# Patient Record
Sex: FEMALE | HISPANIC OR LATINO | Employment: UNEMPLOYED | URBAN - METROPOLITAN AREA
[De-identification: names, ages, dates, MRNs, and addresses within clinical notes are randomized per-mention and may not be internally consistent; named-entity substitution may affect disease eponyms.]

---

## 2017-11-17 ENCOUNTER — HOSPITAL ENCOUNTER (EMERGENCY)
Facility: MEDICAL CENTER | Age: 42
End: 2017-11-18
Attending: EMERGENCY MEDICINE
Payer: COMMERCIAL

## 2017-11-17 ENCOUNTER — APPOINTMENT (OUTPATIENT)
Dept: RADIOLOGY | Facility: MEDICAL CENTER | Age: 42
End: 2017-11-17
Attending: EMERGENCY MEDICINE
Payer: COMMERCIAL

## 2017-11-17 DIAGNOSIS — N39.0 URINARY TRACT INFECTION WITHOUT HEMATURIA, SITE UNSPECIFIED: ICD-10-CM

## 2017-11-17 DIAGNOSIS — R50.9 FEVER, UNSPECIFIED FEVER CAUSE: ICD-10-CM

## 2017-11-17 DIAGNOSIS — R51.9 NONINTRACTABLE HEADACHE, UNSPECIFIED CHRONICITY PATTERN, UNSPECIFIED HEADACHE TYPE: ICD-10-CM

## 2017-11-17 LAB
ALBUMIN SERPL BCP-MCNC: 3.9 G/DL (ref 3.2–4.9)
ALBUMIN/GLOB SERPL: 1.1 G/DL
ALP SERPL-CCNC: 211 U/L (ref 30–99)
ALT SERPL-CCNC: 59 U/L (ref 2–50)
ANION GAP SERPL CALC-SCNC: 13 MMOL/L (ref 0–11.9)
ANISOCYTOSIS BLD QL SMEAR: ABNORMAL
AST SERPL-CCNC: 100 U/L (ref 12–45)
BASOPHILS # BLD AUTO: 0 % (ref 0–1.8)
BASOPHILS # BLD: 0 K/UL (ref 0–0.12)
BILIRUB SERPL-MCNC: 1.5 MG/DL (ref 0.1–1.5)
BUN SERPL-MCNC: 8 MG/DL (ref 8–22)
CALCIUM SERPL-MCNC: 8.8 MG/DL (ref 8.5–10.5)
CHLORIDE SERPL-SCNC: 100 MMOL/L (ref 96–112)
CO2 SERPL-SCNC: 20 MMOL/L (ref 20–33)
CREAT SERPL-MCNC: 0.81 MG/DL (ref 0.5–1.4)
EOSINOPHIL # BLD AUTO: 0 K/UL (ref 0–0.51)
EOSINOPHIL NFR BLD: 0 % (ref 0–6.9)
ERYTHROCYTE [DISTWIDTH] IN BLOOD BY AUTOMATED COUNT: 38.9 FL (ref 35.9–50)
GFR SERPL CREATININE-BSD FRML MDRD: >60 ML/MIN/1.73 M 2
GLOBULIN SER CALC-MCNC: 3.7 G/DL (ref 1.9–3.5)
GLUCOSE SERPL-MCNC: 154 MG/DL (ref 65–99)
HCT VFR BLD AUTO: 34.9 % (ref 37–47)
HGB BLD-MCNC: 12 G/DL (ref 12–16)
LACTATE BLD-SCNC: 2.9 MMOL/L (ref 0.5–2)
LYMPHOCYTES # BLD AUTO: 0.99 K/UL (ref 1–4.8)
LYMPHOCYTES NFR BLD: 22.6 % (ref 22–41)
MANUAL DIFF BLD: NORMAL
MCH RBC QN AUTO: 25.5 PG (ref 27–33)
MCHC RBC AUTO-ENTMCNC: 34.4 G/DL (ref 33.6–35)
MCV RBC AUTO: 74.3 FL (ref 81.4–97.8)
MICROCYTES BLD QL SMEAR: ABNORMAL
MONOCYTES # BLD AUTO: 0.3 K/UL (ref 0–0.85)
MONOCYTES NFR BLD AUTO: 6.9 % (ref 0–13.4)
MORPHOLOGY BLD-IMP: NORMAL
NEUTROPHILS # BLD AUTO: 3.1 K/UL (ref 2–7.15)
NEUTROPHILS NFR BLD: 67 % (ref 44–72)
NEUTS BAND NFR BLD MANUAL: 3.5 % (ref 0–10)
NRBC # BLD AUTO: 0 K/UL
NRBC BLD AUTO-RTO: 0 /100 WBC
PLATELET # BLD AUTO: 147 K/UL (ref 164–446)
PLATELET BLD QL SMEAR: NORMAL
PMV BLD AUTO: 10.3 FL (ref 9–12.9)
POTASSIUM SERPL-SCNC: 3.1 MMOL/L (ref 3.6–5.5)
PROT SERPL-MCNC: 7.6 G/DL (ref 6–8.2)
RBC # BLD AUTO: 4.7 M/UL (ref 4.2–5.4)
RBC BLD AUTO: PRESENT
SODIUM SERPL-SCNC: 133 MMOL/L (ref 135–145)
WBC # BLD AUTO: 4.4 K/UL (ref 4.8–10.8)

## 2017-11-17 PROCEDURE — 87186 SC STD MICRODIL/AGAR DIL: CPT

## 2017-11-17 PROCEDURE — 80053 COMPREHEN METABOLIC PANEL: CPT

## 2017-11-17 PROCEDURE — 71010 DX-CHEST-PORTABLE (1 VIEW): CPT

## 2017-11-17 PROCEDURE — 700102 HCHG RX REV CODE 250 W/ 637 OVERRIDE(OP): Performed by: EMERGENCY MEDICINE

## 2017-11-17 PROCEDURE — 700105 HCHG RX REV CODE 258: Performed by: EMERGENCY MEDICINE

## 2017-11-17 PROCEDURE — 70480 CT ORBIT/EAR/FOSSA W/O DYE: CPT

## 2017-11-17 PROCEDURE — A9270 NON-COVERED ITEM OR SERVICE: HCPCS | Performed by: EMERGENCY MEDICINE

## 2017-11-17 PROCEDURE — 93005 ELECTROCARDIOGRAM TRACING: CPT | Performed by: EMERGENCY MEDICINE

## 2017-11-17 PROCEDURE — 87086 URINE CULTURE/COLONY COUNT: CPT

## 2017-11-17 PROCEDURE — 87040 BLOOD CULTURE FOR BACTERIA: CPT | Mod: 91

## 2017-11-17 PROCEDURE — 85027 COMPLETE CBC AUTOMATED: CPT

## 2017-11-17 PROCEDURE — 99285 EMERGENCY DEPT VISIT HI MDM: CPT

## 2017-11-17 PROCEDURE — 81001 URINALYSIS AUTO W/SCOPE: CPT

## 2017-11-17 PROCEDURE — 85007 BL SMEAR W/DIFF WBC COUNT: CPT

## 2017-11-17 PROCEDURE — 83605 ASSAY OF LACTIC ACID: CPT

## 2017-11-17 PROCEDURE — 96361 HYDRATE IV INFUSION ADD-ON: CPT

## 2017-11-17 PROCEDURE — 87077 CULTURE AEROBIC IDENTIFY: CPT

## 2017-11-17 PROCEDURE — 36415 COLL VENOUS BLD VENIPUNCTURE: CPT

## 2017-11-17 RX ORDER — AMPICILLIN AND SULBACTAM 2; 1 G/1; G/1
3 INJECTION, POWDER, FOR SOLUTION INTRAMUSCULAR; INTRAVENOUS ONCE
Status: DISCONTINUED | OUTPATIENT
Start: 2017-11-18 | End: 2017-11-17

## 2017-11-17 RX ORDER — SODIUM CHLORIDE 9 MG/ML
1000 INJECTION, SOLUTION INTRAVENOUS
Status: DISCONTINUED | OUTPATIENT
Start: 2017-11-17 | End: 2017-11-18 | Stop reason: HOSPADM

## 2017-11-17 RX ORDER — ACETAMINOPHEN 325 MG/1
1000 TABLET ORAL ONCE
Status: COMPLETED | OUTPATIENT
Start: 2017-11-17 | End: 2017-11-17

## 2017-11-17 RX ORDER — SODIUM CHLORIDE 9 MG/ML
30 INJECTION, SOLUTION INTRAVENOUS ONCE
Status: COMPLETED | OUTPATIENT
Start: 2017-11-17 | End: 2017-11-17

## 2017-11-17 RX ADMIN — SODIUM CHLORIDE 2232 ML: 9 INJECTION, SOLUTION INTRAVENOUS at 23:04

## 2017-11-17 RX ADMIN — ACETAMINOPHEN 975 MG: 325 TABLET, FILM COATED ORAL at 23:15

## 2017-11-17 ASSESSMENT — LIFESTYLE VARIABLES: DO YOU DRINK ALCOHOL: NO

## 2017-11-18 VITALS
HEIGHT: 64 IN | TEMPERATURE: 100.6 F | WEIGHT: 164 LBS | BODY MASS INDEX: 28 KG/M2 | OXYGEN SATURATION: 98 % | RESPIRATION RATE: 18 BRPM | DIASTOLIC BLOOD PRESSURE: 87 MMHG | SYSTOLIC BLOOD PRESSURE: 117 MMHG | HEART RATE: 90 BPM

## 2017-11-18 LAB
APPEARANCE UR: ABNORMAL
BACTERIA #/AREA URNS HPF: ABNORMAL /HPF
BILIRUB UR QL STRIP.AUTO: NEGATIVE
COLOR UR: YELLOW
EKG IMPRESSION: NORMAL
EPI CELLS #/AREA URNS HPF: ABNORMAL /HPF
GLUCOSE UR STRIP.AUTO-MCNC: NEGATIVE MG/DL
HYALINE CASTS #/AREA URNS LPF: ABNORMAL /LPF
KETONES UR STRIP.AUTO-MCNC: NEGATIVE MG/DL
LACTATE BLD-SCNC: 1.2 MMOL/L (ref 0.5–2)
LEUKOCYTE ESTERASE UR QL STRIP.AUTO: ABNORMAL
MICRO URNS: ABNORMAL
NITRITE UR QL STRIP.AUTO: POSITIVE
PH UR STRIP.AUTO: 6 [PH]
PROT UR QL STRIP: NEGATIVE MG/DL
RBC # URNS HPF: ABNORMAL /HPF
RBC UR QL AUTO: ABNORMAL
SP GR UR STRIP.AUTO: 1.01
TRANS CELLS #/AREA URNS HPF: ABNORMAL /HPF
UROBILINOGEN UR STRIP.AUTO-MCNC: 1 MG/DL
WBC #/AREA URNS HPF: ABNORMAL /HPF

## 2017-11-18 PROCEDURE — 96361 HYDRATE IV INFUSION ADD-ON: CPT

## 2017-11-18 PROCEDURE — 96372 THER/PROPH/DIAG INJ SC/IM: CPT

## 2017-11-18 PROCEDURE — 700101 HCHG RX REV CODE 250: Performed by: EMERGENCY MEDICINE

## 2017-11-18 PROCEDURE — 96365 THER/PROPH/DIAG IV INF INIT: CPT

## 2017-11-18 PROCEDURE — 700111 HCHG RX REV CODE 636 W/ 250 OVERRIDE (IP): Performed by: EMERGENCY MEDICINE

## 2017-11-18 PROCEDURE — 83605 ASSAY OF LACTIC ACID: CPT

## 2017-11-18 RX ORDER — CLINDAMYCIN HYDROCHLORIDE 150 MG/1
300 CAPSULE ORAL 3 TIMES DAILY
Qty: 60 CAP | Refills: 0 | Status: SHIPPED | OUTPATIENT
Start: 2017-11-18 | End: 2017-11-18

## 2017-11-18 RX ORDER — CEFDINIR 300 MG/1
300 CAPSULE ORAL 2 TIMES DAILY
Qty: 20 CAP | Refills: 0 | Status: SHIPPED | OUTPATIENT
Start: 2017-11-18 | End: 2020-04-18

## 2017-11-18 RX ORDER — NAPROXEN 500 MG/1
500 TABLET ORAL 2 TIMES DAILY WITH MEALS
Qty: 20 TAB | Refills: 0 | Status: SHIPPED | OUTPATIENT
Start: 2017-11-18 | End: 2017-11-28

## 2017-11-18 RX ORDER — KETOROLAC TROMETHAMINE 30 MG/ML
30 INJECTION, SOLUTION INTRAMUSCULAR; INTRAVENOUS ONCE
Status: COMPLETED | OUTPATIENT
Start: 2017-11-18 | End: 2017-11-18

## 2017-11-18 RX ORDER — CLINDAMYCIN PHOSPHATE 900 MG/50ML
900 INJECTION, SOLUTION INTRAVENOUS ONCE
Status: COMPLETED | OUTPATIENT
Start: 2017-11-18 | End: 2017-11-18

## 2017-11-18 RX ADMIN — CLINDAMYCIN IN 5 PERCENT DEXTROSE 900 MG: 18 INJECTION, SOLUTION INTRAVENOUS at 00:58

## 2017-11-18 RX ADMIN — KETOROLAC TROMETHAMINE 30 MG: 30 INJECTION, SOLUTION INTRAMUSCULAR at 00:58

## 2017-11-18 NOTE — ED PROVIDER NOTES
"ED Provider Note    ED Provider Note      Primary care provider: Pcp Pt States None    CHIEF COMPLAINT  Chief Complaint   Patient presents with   • Headache   • Earache       HPI  Marce Johnson is a 42 y.o. female who presents to the Emergency DepartmentChief complaint of left-sided headache. Patient was previously diagnosed with otitis media took a couple doses of amoxicillin and begin have rash that that she discontinued. Over the last few days she's had progressive headache she's had severe chills and subjective fevers. Minor nausea no emesis. The headache is over theLeft side of her head the occipital area with some left-sided neck pain. No right-sided neck pain no midline pain. Patient also reports some diffuse myalgias. No sore throat no altered mental status she denies neck pain she's had no chest pain or shortness breath has had a slight cough no abdominal clinical . Dysuria hematuria melena hematochezia pains rated as moderate without alleviating or aggravating factors at this time.    REVIEW OF SYSTEMS  10 systems reviewed and otherwise negative, pertinent positives and negatives listed in the history of present illness.    PAST MEDICAL HISTORY       SURGICAL HISTORY   has a past surgical history that includes primary c section (x 3 ) and repeat c section w tubal ligation (2/6/2011).    SOCIAL HISTORY  Social History   Substance Use Topics   • Smoking status: Never Smoker   • Smokeless tobacco: Not on file   • Alcohol use No      History   Drug Use No       FAMILY HISTORY  Non-Contributory    CURRENT MEDICATIONS  Home Medications    **Home medications have not yet been reviewed for this encounter**         ALLERGIES  Allergies   Allergen Reactions   • Nkda [No Known Drug Allergy]        PHYSICAL EXAM  VITAL SIGNS: /93   Pulse (!) 115   Temp (!) 39.6 °C (103.2 °F)   Resp 16   Ht 1.626 m (5' 4\")   Wt 74.4 kg (164 lb)   LMP 05/17/2010 Comment: Sure first day LNMP  SpO2 98%   BMI 28.15 " kg/m²   Pulse ox interpretation: I interpret this pulse ox as normal.  Constitutional: Alert and oriented x 3,Moderate Distress  HEENT: Atraumatic normocephalic, pupils are equal round reactive to light extraocular movements are intact. The nares is clear, external ears are normal the right tympanic membrane is opacified slightly retracted external auditory canal is erythematous. The left TM and external auditory canal unremarkable her is minor tenderness to palpation over the mastoid on the right is not soft no overlying erythema or warmth., mouth shows moist mucous membranes  Neck: Supple, no JVD no tracheal deviation no painwith rapid axial rotation or flexion or extension  Cardiovascular: Tachycardic no murmur rub or gallop 2+ pulses peripherally x4  Thorax & Lungs: No respiratory distress, no wheezes rales or rhonchi, No chest tenderness.   GI: Soft nontender nondistended positive bowel sounds, no peritoneal signs  Skin: Warm dry no acute rash or lesion  Musculoskeletal: Moving all extremities with full range and 5 of 5 strength, no acute  deformity  Neurologic: Cranial nerves III through XII are grossly intact, no sensory deficit, no cerebellar dysfunction   Psychiatric: Appropriate affect for situation at this time      DIAGNOSTIC STUDIES / PROCEDURES  LABS  Results for orders placed or performed during the hospital encounter of 11/17/17   LACTIC ACID   Result Value Ref Range    Lactic Acid 2.9 (H) 0.5 - 2.0 mmol/L   LACTIC ACID   Result Value Ref Range    Lactic Acid 1.2 0.5 - 2.0 mmol/L   CBC WITH DIFFERENTIAL   Result Value Ref Range    WBC 4.4 (L) 4.8 - 10.8 K/uL    RBC 4.70 4.20 - 5.40 M/uL    Hemoglobin 12.0 12.0 - 16.0 g/dL    Hematocrit 34.9 (L) 37.0 - 47.0 %    MCV 74.3 (L) 81.4 - 97.8 fL    MCH 25.5 (L) 27.0 - 33.0 pg    MCHC 34.4 33.6 - 35.0 g/dL    RDW 38.9 35.9 - 50.0 fL    Platelet Count 147 (L) 164 - 446 K/uL    MPV 10.3 9.0 - 12.9 fL    Nucleated RBC 0.00 /100 WBC    NRBC (Absolute) 0.00 K/uL     Neutrophils-Polys 67.00 44.00 - 72.00 %    Lymphocytes 22.60 22.00 - 41.00 %    Monocytes 6.90 0.00 - 13.40 %    Eosinophils 0.00 0.00 - 6.90 %    Basophils 0.00 0.00 - 1.80 %    Neutrophils (Absolute) 3.10 2.00 - 7.15 K/uL    Lymphs (Absolute) 0.99 (L) 1.00 - 4.80 K/uL    Monos (Absolute) 0.30 0.00 - 0.85 K/uL    Eos (Absolute) 0.00 0.00 - 0.51 K/uL    Baso (Absolute) 0.00 0.00 - 0.12 K/uL    Anisocytosis 1+     Microcytosis 1+    COMP METABOLIC PANEL   Result Value Ref Range    Sodium 133 (L) 135 - 145 mmol/L    Potassium 3.1 (L) 3.6 - 5.5 mmol/L    Chloride 100 96 - 112 mmol/L    Co2 20 20 - 33 mmol/L    Anion Gap 13.0 (H) 0.0 - 11.9    Glucose 154 (H) 65 - 99 mg/dL    Bun 8 8 - 22 mg/dL    Creatinine 0.81 0.50 - 1.40 mg/dL    Calcium 8.8 8.5 - 10.5 mg/dL    AST(SGOT) 100 (H) 12 - 45 U/L    ALT(SGPT) 59 (H) 2 - 50 U/L    Alkaline Phosphatase 211 (H) 30 - 99 U/L    Total Bilirubin 1.5 0.1 - 1.5 mg/dL    Albumin 3.9 3.2 - 4.9 g/dL    Total Protein 7.6 6.0 - 8.2 g/dL    Globulin 3.7 (H) 1.9 - 3.5 g/dL    A-G Ratio 1.1 g/dL   URINALYSIS   Result Value Ref Range    Color Yellow     Character Cloudy (A)     Specific Gravity 1.008 <1.035    Ph 6.0 5.0 - 8.0    Glucose Negative Negative mg/dL    Ketones Negative Negative mg/dL    Protein Negative Negative mg/dL    Bilirubin Negative Negative    Urobilinogen, Urine 1.0 Negative    Nitrite Positive (A) Negative    Leukocyte Esterase Small (A) Negative    Occult Blood Small (A) Negative    Micro Urine Req Microscopic    ESTIMATED GFR   Result Value Ref Range    GFR If African American >60 >60 mL/min/1.73 m 2    GFR If Non African American >60 >60 mL/min/1.73 m 2   DIFFERENTIAL MANUAL   Result Value Ref Range    Bands-Stabs 3.50 0.00 - 10.00 %    Manual Diff Status PERFORMED    PERIPHERAL SMEAR REVIEW   Result Value Ref Range    Peripheral Smear Review see below    PLATELET ESTIMATE   Result Value Ref Range    Plt Estimation Decreased    MORPHOLOGY   Result Value Ref  Range    RBC Morphology Present    URINE MICROSCOPIC (W/UA)   Result Value Ref Range    WBC 10-20 (A) /hpf    RBC 2-5 (A) /hpf    Bacteria Many (A) None /hpf    Epithelial Cells Moderate (A) /hpf    Trans Epithelial Cells Rare /hpf    Hyaline Cast 6-10 (A) /lpf   EKG (ER)   Result Value Ref Range    Report       [facility]    Test Date:  2017  Pt Name:    PAULO SERVIN                 Department: ER  MRN:        [id number]                      Room:       [facility]  Gender:     F                            Technician: [id number]  :        [id number]                   Requested By:LEONEL ENGEL  Order #:    [id number]                    Reading MD:    Measurements  Intervals                                Axis  Rate:       99                           P:          61  AK:         140                          QRS:        68  QRSD:       72                           T:          -3  QT:         324  QTc:        416    Interpretive Statements  SINUS RHYTHM  BORDERLINE T ABNORMALITIES, DIFFUSE LEADS  Compared to ECG 2013 20:20:29  T-wave abnormality now present  Sinus tachycardia no longer present         All labs reviewed by me.    EKG Interpretation  Interpreted by me    Sinus rhythm at a rate of 99 no ST elevation ST depression and T-wave inversion normal axis normal intervals no other acute abnormalities.      RADIOLOGY  CT-POST-FOSSA-EAR W/O   Final Result      Unremarkable CT of the temporal bones without contrast. Incidental polypoid lesions in the right maxillary sinus.      DX-CHEST-PORTABLE (1 VIEW)   Final Result      No acute cardiopulmonary abnormality.        The radiologist's interpretation of all radiological studies have been reviewed by me.    COURSE & MEDICAL DECISION MAKING  Pertinent Labs & Imaging studies reviewed. (See chart for details)    10:37 PM - Patient seen and examined at bedside.     Prescription monitoring program queried and unremarkable.    Patient  "noted to have slightly elevated blood pressure likely circumstantial secondary to presenting complaint. Referred to primary care physician for further evaluation.    Tachycardia and lactic acidosis    Medical Decision Making:  Patient arrives with constellation of symptoms as above. Recent history of otitis media and posterior aspect with radiation in the neck somewhat concerned for acute mastoiditis. CT temporal bones unremarkable. No other acute abnormality noted except for probable mucous retention cyst in the maxillary sinus. Patient's lab works fairly unremarkable with the exception of lactic acid of 2.9 at arrival. Patient was given fluid bolus was given a dose of clindamycin IV with concerns that this was rising primary ear and she had recent reaction to amoxicillin. This was found to be fairly unremarkable lactic acidosis improved. Pain is improved. No sign of meningismus. I don't those of further emergency evaluation or treatment necessary patient was placed on Omnicef she has primary care physician and understands follow-up primary care and 2 days for reevaluation. She is return here in 1224 hrs. if she has any ongoing headache neck pain fevers not responsive to antipyretics confusion and altered mental status and the other acute concern. With patient's unilateral neck pain no midline pain no meningismus normal CT scan of the temporal bones I don't feel so lumbar puncture would provide additional benefit at this time and would expose patient to additional invasive procedures patient a reliable agrees with assessment and plan all of the discussion and discharge instructions were given through  line.  /85   Pulse 90   Temp (!) 38.1 °C (100.6 °F)   Resp 18   Ht 1.626 m (5' 4\")   Wt 74.4 kg (164 lb)   LMP 05/17/2010 Comment: Sure first day LNMP  SpO2 98%   BMI 28.15 kg/m²     [address] [address]  [phone]  In 2 days      [facility] " [facility], Emergency Dept  [facility] [facility] [id number] [phone]    immediately if symptoms worsen        FINAL IMPRESSION  1. Febrile illness   2. Urinary tract infection  3. Headache    This dictation has been created using voice recognition software and/or scribes. The accuracy of the dictation is limited by the abilities of the software and the expertise of the scribes. I expect there may be some errors of grammar and possibly content. I made every attempt to manually correct the errors within my dictation. However, errors related to voice recognition software and/or scribes may still exist and should be interpreted within the appropriate context.

## 2017-11-18 NOTE — ED NOTES
Family states pt had enlarged lymph node on left 3 days ago, that pt took amoxicillin and ibuprofen for. Pt started itching after taking the amoxicillin so stopped taking it, took tylenol for ha and started itching after taking the tylenol. Pt states was dx'd w/ ear infection when prescribed amoxicillin

## 2017-11-18 NOTE — ED NOTES
Pt given d/c instructions/prescription/home care instructions,  Pt given 2 Rx, pt verbalized understanding of POC, pt to ER lobby in wheelchair with family.

## 2017-11-18 NOTE — DISCHARGE INSTRUCTIONS
"Fiebre  (Fever)  La fiebre es la temperatura del organismo más elevada que la normal. La temperatura normal varía con:  · La edad.   · El modo en que se mide (boca, axila, recto u oído).   · El momento del día.   En el adulto, irina temperatura normal generalmente es de 98,6 Fahrenheit (F) o 37° Celsius (C). El aumento de temperatura en alrededor de 1.8° F ó 1 °C generalmente se considera fiebre (100. 4° F. ó 38 °C ) En un bebé de 28 días o menos, la temperatura rectal de 100.4° F (38° C) generalmente se considera fiebre. La fiebre no es irina enfermedad, sino que es el síntoma de irina enfermedad.  CAUSAS  · Generalmente la causa es irina infección.   · Otros problemas no infecciosos pueden causar fiebre. Por ejemplo:   · Algunos problemas de artritis.   · Trastornos de las glándulas hipófisis o suprarrenales.   · Problemas del sistema inmunológico.   · Algunos tipos de cáncer.   · Irina reacción a ciertos medicamentos.   · En ocasiones, la causa no puede determinarse. En estos casos se denomina \"fiebre de origen desconocido\".   · Algunas situaciones pueden llevar a un aumento transitorio de la temperatura corporal, que puede desaparecer sin tratamiento. Por ejemplo:   · El parto   · Irina cirugía   · Algunas situaciones pueden causar un aumento en la temperatura corporal medardo no se consideran \"fiebre verdadera\". Por ejemplo:   · Actividad física intensa   · Deshidratación.   · Exposición a temperaturas elevadas en el exterior o en un ambiente.   SÍNTOMAS  · Sentirse acalorado o caliente.   · Sensación de fatiga o cansancio extremo.   · Cynthia en todo el cuerpo.   · Escalofríos.   · Temblores   · Sudoración   DIAGNÓSTICO  El médico puede sospechar la presencia de fiebre al sentir que parmar piel está demasiado caliente. Luego se confirma tomando la temperatura con un termómetro. La temperatura puede tomarse de diferentes modos. Algunos métodos son precisos y otros no lo son.  En adultos, adolescentes y niños:   · Generalmente se " mariajose la temperatura oral.   · El termómetro en el oído solo será exacto si se ubica según las indicaciones del fabricante.   · La temperatura que se mariajose en la axila no es precisa y no se recomienda.   · La mayoría de los termómetros electrónicos son rápidos y precisos.   Bebés y deambuladores:  · La temperatura rectal es la más recomendada y la más precisa.   · La temperatura tomada en el oído no es precisa en luis armando jovany etario, por lo tanto no se recomienda.   · Los termómetros de piel no son precisos.   RIESGOS Y COMPLICACIONES  · Cuando hay fiebre el organismo utiliza más oxígeno, de modo que la persona puede acelerar la respiración o sentir falta de aire. [place] puede ser peligroso especialmente en personas con enfermedades cardíacas o pulmonares.   · La sudoración que se produce luego de la fiebre puede causar deshidratación.   · La fiebre suzanna puede ocasionar convulsiones en bebés y niños.   · Los ancianos pueden presentar confusión charles los episodios de fiebre.   TRATAMIENTO  · Pueden administrarse algunos medicamentos que controlarán la temperatura.   · No administre aspirina a los niños con fiebre. Se asocia con el síndrome de [place]. El síndrome de [place] es sushil enfermedad enoc medardo potencialmente fatal.   · Si sufre sushil infección y le arias prescripto medicamentos, tómelos bianca se le ha indicado. [place] todos los medicamentos hasta terminarlos.   · Pasar sushil esponja o un baño con agua a temperatura ambiente puede ayudar a reducir la temperatura corporal. No use agua con hielo ni pase esponjas con alcohol.   · No abrigue demasiado a los niños con mantas o ropas pesadas.   · Administrar la cantidad de líquidos adecuada charles sushil enfermedad que cursa con fiebre es importante para prevenir la deshidratación.   INSTRUCCIONES PARA EL CUIDADO DOMICILIARIO  · Si se trata de un adulto, es importante el reposo y la ingesta adecuada de líquidos. La vestimenta debe ser acorde a la necesidad, medardo no excesiva.   · Hay que  beber gran cantidad de líquido para mantener la orina de benitez sin o color amarillo pálido.   · En los bebés de más de 3 meses y en los niños, administre los medicamentos de acuerdo a las indicaciones del médico. La dosis se basan el peso del ginger. NO administre más de lo recomendado.   SOLICITE ATENCIÓN MÉDICA SI:  · Usted o parmar hijo no pueden retener líquidos.   · Sufre vómitos o diarrea.   · Aparece sushil erupción cutánea.   · La temperatura oral aumenta a más de 102° F (38.9° C), o la fiebre persiste por más de 3 días.   · Presenta debilidad excesiva, mareos, lipotimia o sed extrema.   · La fiebre vuelve luego de 3 regan.   SOLICITE ATENCIÓN MÉDICA DE INMEDIATO SI:  · Le falta el aire o tiene dificultad para respirar.   · Se desmaya.   · Observa que orina poco o no orina en absoluto.   · Siente nuevos ashlee que no había sentido antes (bianca dolor de paul, liam, pecho, espalda o abdomen).   · No puede retener los líquidos.   · Los vómitos y la diarrea persisten charles más de lexi o dos días.   · Siente rigidez en el liam y alicia ojos tienen sensibilidad a la raegan.   · La temperatura oral se eleva sin motivo por encima de 102° F (38.9° C).   Document Released: 09/27/2006 Document Revised: 03/11/2013  ExitCare® Patient Information ©2013 [employer].    Dolor de paul general sin causa   (General Headache Without Cause)   Un dolor de paul en general es un dolor o malestar que se siente en la daniel de la paul o del liam. Se desconocen las causas.   CUIDADOS EN EL HOGAR   · Cumpla con los controles médicos según las indicaciones.  · [place] sólo los medicamentos que le haya indicado el médico.  · Cuando sienta dolor de paul acuéstese en un cuarto oscuro y tranquilo.  · Lleve un registro diario para averiguar si ciertas cosas provocan ashlee de paul. Por ejemplo, escriba:  ¨ Lo que come y alem.  ¨ Cuánto tiempo duerme.  ¨ Todo cambio en la dieta o medicamentos.  · Relájese recibiendo masajes o ana otras  actividades relajantes.  · Coloque hielo o calor en la paul y el liam bianca lo indique parmar médico.  · DISMINUYA EL NIVEL DE ESTRÉS  · Siéntase con la espalda recta. No apriete los músculos (tensione).  · Si fuma, deje de hacerlo.  · Elisabet menos alcohol.  · Consuma menos cafeína o deje de tomarla.  · Coma y duerma en horarios regulares.  · Duerma entre 7 y 9 horas o bianca le indique parmar médico.  · Mantenga las luces tenues si le molesta las luces brillantes o sophie ashlee de paul empeoran.  SOLICITE AYUDA DE INMEDIATO SI:   · El dolor de paul empeora.  · Tiene fiebre.  · Presenta rigidez en el liam.  · Tiene dificultad para ernestine.  · Sophie músculos están débiles o pierde el control muscular.  · Pierde equilibrio o tiene problemas para caminar.  · Siente que se desvanece (debilidad) o se desmaya.  · Tiene síntomas intensos que son diferentes a los primeros síntomas.  · Tiene problemas con los medicamentos que le recetó parmar médico.  · El medicamento no le hace efecto.  · Siente que el dolor de paul es diferente a otros ashlee de paul.  · Tiene malestar estomacal (náuseas) o (vómitos).     Esta información no tiene bianca fin reemplazar el consejo del médico. Asegúrese de hacerle al médico cualquier pregunta que tenga.     Document Released: 03/11/2013 Document Revised: 05/03/2016  Elsevier Interactive Patient Education ©2016 [employer]      Infección urinaria   (Urinary Tract Infection)   Irina infección urinaria puede ocurrir en cualquier lugar del tracto urinario. El tracto incluye los riñones, uréteres, la vejiga y la uretra. La causa es un germen llamado bacteria. La infección urinaria mejora con antibióticos.   CUIDADOS EN EL HOGAR   · Si le recetaron antibióticos, tómelos bianca le haya indicado el médico. Tómelos todos, aunque se sienta mejor.  · Elisabet gran cantidad de líquido para mantener el pis (orina) de benitez sin o amarillo pálido.  · Evite el té, las bebidas con cafeína y las bebidas gaseosas  (carbonatada).  · Orine con frecuencia. Evite retener la orina charles mucho tiempo.  · Orine antes y después de tener sexo (relaciones sexuales).  · Si es ezekiel, higienícese desde adelante hacia atrás después de ir de cuerpo ( el intestino). Use sólo un papel tissue por vez.  SOLICITE AYUDA DE INMEDIATO SI:   · Siente dolor en la espalda.  · Siente un dolor en el vientre (abdominal) muy intenso.  · Tiene escalofríos.  · Tiene malestar estomacal (náuseas).  · Vomita.  · El ardor o las molestias al orinar no desaparecen.  · Tiene fiebre.  · Los síntomas no mejoran después de 3 días.  ASEGÚRESE DE QUE:   · Comprende estas instrucciones.  · Controlará parmar enfermedad.  · Solicitará ayuda de inmediato si no mejora o si empeora.     Esta información no tiene bianca fin reemplazar el consejo del médico. Asegúrese de hacerle al médico cualquier pregunta que tenga.     Document Released: 06/07/2011 Document Revised: 09/11/2013  [employer] Interactive Patient Education ©2016 [employer]

## 2017-11-19 ENCOUNTER — HOSPITAL ENCOUNTER (EMERGENCY)
Facility: MEDICAL CENTER | Age: 42
End: 2017-11-19
Attending: EMERGENCY MEDICINE
Payer: COMMERCIAL

## 2017-11-19 VITALS
OXYGEN SATURATION: 97 % | SYSTOLIC BLOOD PRESSURE: 126 MMHG | DIASTOLIC BLOOD PRESSURE: 85 MMHG | BODY MASS INDEX: 28.15 KG/M2 | RESPIRATION RATE: 16 BRPM | TEMPERATURE: 97.5 F | HEART RATE: 98 BPM | WEIGHT: 164.02 LBS

## 2017-11-19 DIAGNOSIS — T78.40XA ALLERGIC REACTION, INITIAL ENCOUNTER: ICD-10-CM

## 2017-11-19 PROCEDURE — 36415 COLL VENOUS BLD VENIPUNCTURE: CPT

## 2017-11-19 PROCEDURE — 700111 HCHG RX REV CODE 636 W/ 250 OVERRIDE (IP): Performed by: EMERGENCY MEDICINE

## 2017-11-19 PROCEDURE — 99285 EMERGENCY DEPT VISIT HI MDM: CPT

## 2017-11-19 PROCEDURE — 96375 TX/PRO/DX INJ NEW DRUG ADDON: CPT

## 2017-11-19 PROCEDURE — 700105 HCHG RX REV CODE 258: Performed by: EMERGENCY MEDICINE

## 2017-11-19 PROCEDURE — 96374 THER/PROPH/DIAG INJ IV PUSH: CPT

## 2017-11-19 RX ORDER — DIPHENHYDRAMINE HYDROCHLORIDE 50 MG/ML
25 INJECTION INTRAMUSCULAR; INTRAVENOUS ONCE
Status: COMPLETED | OUTPATIENT
Start: 2017-11-19 | End: 2017-11-19

## 2017-11-19 RX ORDER — METHYLPREDNISOLONE SODIUM SUCCINATE 125 MG/2ML
125 INJECTION, POWDER, LYOPHILIZED, FOR SOLUTION INTRAMUSCULAR; INTRAVENOUS ONCE
Status: COMPLETED | OUTPATIENT
Start: 2017-11-19 | End: 2017-11-19

## 2017-11-19 RX ORDER — FAMOTIDINE 40 MG/1
40 TABLET, FILM COATED ORAL DAILY
Qty: 7 TAB | Refills: 0 | Status: SHIPPED | OUTPATIENT
Start: 2017-11-19 | End: 2017-11-26

## 2017-11-19 RX ORDER — SODIUM CHLORIDE 9 MG/ML
1000 INJECTION, SOLUTION INTRAVENOUS ONCE
Status: COMPLETED | OUTPATIENT
Start: 2017-11-19 | End: 2017-11-19

## 2017-11-19 RX ORDER — PREDNISONE 20 MG/1
40 TABLET ORAL DAILY
Qty: 8 TAB | Refills: 0 | Status: SHIPPED | OUTPATIENT
Start: 2017-11-19 | End: 2017-11-23

## 2017-11-19 RX ORDER — DIPHENHYDRAMINE HCL 25 MG
25-50 CAPSULE ORAL EVERY 6 HOURS PRN
Qty: 30 CAP | Refills: 0 | Status: SHIPPED | OUTPATIENT
Start: 2017-11-19 | End: 2020-04-18

## 2017-11-19 RX ORDER — CETIRIZINE HYDROCHLORIDE 10 MG/1
10 TABLET ORAL DAILY
Qty: 30 TAB | Refills: 0 | Status: SHIPPED | OUTPATIENT
Start: 2017-11-19 | End: 2020-04-18

## 2017-11-19 RX ADMIN — FAMOTIDINE 20 MG: 10 INJECTION, SOLUTION INTRAVENOUS at 06:52

## 2017-11-19 RX ADMIN — METHYLPREDNISOLONE SODIUM SUCCINATE 125 MG: 125 INJECTION, POWDER, FOR SOLUTION INTRAMUSCULAR; INTRAVENOUS at 06:51

## 2017-11-19 RX ADMIN — SODIUM CHLORIDE 1000 ML: 9 INJECTION, SOLUTION INTRAVENOUS at 06:51

## 2017-11-19 RX ADMIN — DIPHENHYDRAMINE HYDROCHLORIDE 25 MG: 50 INJECTION, SOLUTION INTRAMUSCULAR; INTRAVENOUS at 06:52

## 2017-11-19 ASSESSMENT — LIFESTYLE VARIABLES: DO YOU DRINK ALCOHOL: NO

## 2017-11-19 ASSESSMENT — ENCOUNTER SYMPTOMS
NAUSEA: 0
FEVER: 1
VOMITING: 0
ABDOMINAL PAIN: 0
SHORTNESS OF BREATH: 0

## 2017-11-19 ASSESSMENT — PAIN SCALES - GENERAL: PAINLEVEL_OUTOF10: 0

## 2017-11-19 NOTE — ED NOTES
Discharged home with s/o. Pt given prescription x 4 with indication. Follow up with pcp in 2 days.

## 2017-11-19 NOTE — ED PROVIDER NOTES
"ED Provider Note    ED Provider Note    Scribed for Mckenna Vasquez D.O. by Mckenna Vasquez. 11/19/2017, 8:23 AM.    Primary care provider: Pcp Pt States None  Means of arrival: POV  History obtained from: Patient  History limited by: None    CHIEF COMPLAINT  Chief Complaint   Patient presents with   • Generalized Body Aches   • Itchy     With redness noted across face and bilateral thighs \"it feels that the itching is from the inside\"        HPI  Marce Johnson is a 42 y.o. female who presents to the Emergency DepartmentWith a chief complaint of a rash. She states the rash started 3 days ago. She states she was here yesterday and given amoxicillin and naproxen for an ear infection. She complains of a fever, right ear pain, myalgias and a rash across her upper face as well as her palms. She denies any tongue swelling. No difficulty breathing. No known allergies. No nausea or vomiting. No abdominal pain. No chest pain.    REVIEW OF SYSTEMS  Review of Systems   Constitutional: Positive for fever.   HENT: Positive for ear pain.    Respiratory: Negative for shortness of breath.    Cardiovascular: Negative for chest pain.   Gastrointestinal: Negative for abdominal pain, nausea and vomiting.   Genitourinary: Negative for dysuria.   Skin: Positive for itching and rash.   All other systems reviewed and are negative.      PAST MEDICAL HISTORY   and denies any past medical history    SURGICAL HISTORY   has a past surgical history that includes primary c section (x 3 ) and repeat c section w tubal ligation (2/6/2011).    SOCIAL HISTORY  Social History   Substance Use Topics   • Smoking status: Never Smoker   • Smokeless tobacco: Not on file   • Alcohol use No      History   Drug Use No       FAMILY HISTORY  Family History   Problem Relation Age of Onset   • Hypertension Maternal Grandmother        CURRENT MEDICATIONS  Amoxicillin, naproxen    ALLERGIES  Allergies   Allergen Reactions   • Nkda [No Known Drug Allergy]  "       PHYSICAL EXAM  VITAL SIGNS: /85   Pulse 87   Temp 36.4 °C (97.5 °F)   Resp 16   Wt 74.4 kg (164 lb 0.4 oz)   LMP 11/10/2017 (Approximate)   SpO2 97%   BMI 28.15 kg/m²   Vitals reviewed.  Constitutional: Patient is oriented to person, place, and time. Appears well-developed and well-nourished.Mild distress.    Head: Normocephalic and atraumatic.   Ears: Normal external ears bilaterally.   Mouth/Throat: Oropharynx is clear and moist, no exudates.  no tongue swelling.  Eyes: Conjunctivae are normal. Pupils are equal, round, and reactive to light.   Neck: Normal range of motion. Neck supple.  Cardiovascular: Normal rate, regular rhythm and normal heart sounds. Normal peripheral pulses, bilateral upper extremities.  Pulmonary/Chest: Effort normal and breath sounds normal. No respiratory distress, no wheezes, rhonchi, or rales.   Abdominal: Soft. Bowel sounds are normal. There is no tenderness, rebound or guarding, or peritoneal signs  Musculoskeletal: No edema and no tenderness.   Lymphadenopathy: No cervical adenopathy.   Neurological: No focal deficits.   Skin: Skin is warm and dry. There is an erythematous, dry slightly raised rash diffusely over her forehead, her upper eyelids. No pallor.   Psychiatric: Patient has a normal mood and affect.     LABS  Results for orders placed or performed during the hospital encounter of 11/17/17   LACTIC ACID   Result Value Ref Range    Lactic Acid 2.9 (H) 0.5 - 2.0 mmol/L   LACTIC ACID   Result Value Ref Range    Lactic Acid 1.2 0.5 - 2.0 mmol/L   CBC WITH DIFFERENTIAL   Result Value Ref Range    WBC 4.4 (L) 4.8 - 10.8 K/uL    RBC 4.70 4.20 - 5.40 M/uL    Hemoglobin 12.0 12.0 - 16.0 g/dL    Hematocrit 34.9 (L) 37.0 - 47.0 %    MCV 74.3 (L) 81.4 - 97.8 fL    MCH 25.5 (L) 27.0 - 33.0 pg    MCHC 34.4 33.6 - 35.0 g/dL    RDW 38.9 35.9 - 50.0 fL    Platelet Count 147 (L) 164 - 446 K/uL    MPV 10.3 9.0 - 12.9 fL    Nucleated RBC 0.00 /100 WBC    NRBC (Absolute) 0.00  K/uL    Neutrophils-Polys 67.00 44.00 - 72.00 %    Lymphocytes 22.60 22.00 - 41.00 %    Monocytes 6.90 0.00 - 13.40 %    Eosinophils 0.00 0.00 - 6.90 %    Basophils 0.00 0.00 - 1.80 %    Neutrophils (Absolute) 3.10 2.00 - 7.15 K/uL    Lymphs (Absolute) 0.99 (L) 1.00 - 4.80 K/uL    Monos (Absolute) 0.30 0.00 - 0.85 K/uL    Eos (Absolute) 0.00 0.00 - 0.51 K/uL    Baso (Absolute) 0.00 0.00 - 0.12 K/uL    Anisocytosis 1+     Microcytosis 1+    COMP METABOLIC PANEL   Result Value Ref Range    Sodium 133 (L) 135 - 145 mmol/L    Potassium 3.1 (L) 3.6 - 5.5 mmol/L    Chloride 100 96 - 112 mmol/L    Co2 20 20 - 33 mmol/L    Anion Gap 13.0 (H) 0.0 - 11.9    Glucose 154 (H) 65 - 99 mg/dL    Bun 8 8 - 22 mg/dL    Creatinine 0.81 0.50 - 1.40 mg/dL    Calcium 8.8 8.5 - 10.5 mg/dL    AST(SGOT) 100 (H) 12 - 45 U/L    ALT(SGPT) 59 (H) 2 - 50 U/L    Alkaline Phosphatase 211 (H) 30 - 99 U/L    Total Bilirubin 1.5 0.1 - 1.5 mg/dL    Albumin 3.9 3.2 - 4.9 g/dL    Total Protein 7.6 6.0 - 8.2 g/dL    Globulin 3.7 (H) 1.9 - 3.5 g/dL    A-G Ratio 1.1 g/dL   URINALYSIS   Result Value Ref Range    Color Yellow     Character Cloudy (A)     Specific Gravity 1.008 <1.035    Ph 6.0 5.0 - 8.0    Glucose Negative Negative mg/dL    Ketones Negative Negative mg/dL    Protein Negative Negative mg/dL    Bilirubin Negative Negative    Urobilinogen, Urine 1.0 Negative    Nitrite Positive (A) Negative    Leukocyte Esterase Small (A) Negative    Occult Blood Small (A) Negative    Micro Urine Req Microscopic    URINE CULTURE(NEW)   Result Value Ref Range    Significant Indicator POS (POS)     Source UR     Site      Urine Culture (A)     Urine Culture (A)      Lactose fermenting Gram negative elissa  >100,000 cfu/mL     BLOOD CULTURE   Result Value Ref Range    Significant Indicator NEG     Source BLD     Site PERIPHERAL     Blood Culture       No Growth    Note: Blood cultures are incubated for 5 days and  are monitored continuously.Positive blood  cultures  are called to the RN and reported as soon as  they are identified.     BLOOD CULTURE   Result Value Ref Range    Significant Indicator NEG     Source BLD     Site PERIPHERAL     Blood Culture       No Growth    Note: Blood cultures are incubated for 5 days and  are monitored continuously.Positive blood cultures  are called to the RN and reported as soon as  they are identified.     ESTIMATED GFR   Result Value Ref Range    GFR If African American >60 >60 mL/min/1.73 m 2    GFR If Non African American >60 >60 mL/min/1.73 m 2   DIFFERENTIAL MANUAL   Result Value Ref Range    Bands-Stabs 3.50 0.00 - 10.00 %    Manual Diff Status PERFORMED    PERIPHERAL SMEAR REVIEW   Result Value Ref Range    Peripheral Smear Review see below    PLATELET ESTIMATE   Result Value Ref Range    Plt Estimation Decreased    MORPHOLOGY   Result Value Ref Range    RBC Morphology Present    URINE MICROSCOPIC (W/UA)   Result Value Ref Range    WBC 10-20 (A) /hpf    RBC 2-5 (A) /hpf    Bacteria Many (A) None /hpf    Epithelial Cells Moderate (A) /hpf    Trans Epithelial Cells Rare /hpf    Hyaline Cast 6-10 (A) /lpf   EKG (ER)   Result Value Ref Range    Report       [facility] Emergency Dept.    Test Date:  2017  Pt Name:    PAULO SERVIN                 Department: ER  MRN:        [id number]                      Room:       [facility]  Gender:     F                            Technician: [id number]  :        [id number]                   Requested By:LEONEL ENGEL  Order #:    [id number]                    Reading MD: LEONEL ENGEL MD    Measurements  Intervals                                Axis  Rate:       99                           P:          61  PA:         140                          QRS:        68  QRSD:       72                           T:          -3  QT:         324  QTc:        416    Interpretive Statements  SINUS RHYTHM  BORDERLINE T ABNORMALITIES, DIFFUSE LEADS  Compared to ECG 2013  20:20:29  T-wave abnormality now present  Sinus tachycardia no longer present    Electronically Signed On 11- 2:03:46 PST by LEONEL ENGEL MD         All labs reviewed by me.    COURSE & MEDICAL DECISION MAKING  Pertinent Labs & Imaging studies reviewed. (See chart for details)    AM - Patient seen and examined at bedside. She has a rash and she is itching her palms. However, she does not complain of any difficulty breathing, wheezing or tongue swelling. Patient will be treated with Solu-Medrol, Benadryl, Pepcid and IV fluids.  The differential diagnoses include but are not limited to: Allergic reaction - it's unclear, at this time whether it's related to naproxen and amoxicillin.    Patient was reevaluated at 7:20 AM. She does report that she is feeling better. The itching has decreased. She has no increased work of breathing. No tongue swelling. No shortness of breath. I've advised, that it will take a little bit longer for the steroids to help which I think will be most beneficial. No additional complaints.    Obtained and reviewed past medical records. Patient was seen November 17 for headache and earache. At that time, it indicated, that the patient took a few doses of amoxicillin for an earache and began to have a rash and so she discontinued it. Patient underwent CT scan of her posterior fossa in the ear. Was unremarkable, acutely. Prior to that, her last encounter was in 2013. She was seen for abdominal pain.    8:35 AM patient old records reviewed. She originally stated that she was here yesterday and diagnosed with an ear infection. According to the ER note patient was seen on the 17th, 2 days ago and had been on antibiotics and developed a rash secondary to them so she only took a few tablets. I clarified this with the patient and indeed, she does report that she was seen in the outpatient setting, and an urgent care where she was started on amoxicillin and naproxen. Her last dose of  amoxicillin was on Tuesday. She apparently developed a Rice soon after starting this. In addition, patient was switched from amoxicillin to Omnicef on the 17th when she was seen in the ED. At this time, I feel there is no further emergent intervention necessary. Patient continued to continue the Omnicef. I've advised that she take a short course of prednisone as well as Benadryl and Pepcid at home. I recommended that she follow up with her primary care doctor. She is well-appearing and nontoxic no increased work of breathing. She satting well on room air. She is not hypoxic or tachycardic. She is safe for discharge to home.    Patient's discharge to home in stable condition.    FINAL IMPRESSION  1. Allergic reaction, initial encounter     - Amoxicillin

## 2017-11-19 NOTE — ED NOTES
".  Chief Complaint   Patient presents with   • Generalized Body Aches   • Itchy     With redness noted across face and bilateral thighs \"it feels that the itching is from the inside\"      Patient to triage in wheelchair.  Dx with ear infection recently and given amoxicillin, which patient had a reaction to and stopped taking.  Seen here recently, given IV antibiotics and fluids  Itching at home uncontrollable per patient.  Patient took Benadryl at home one hour ago.      Patient educated on triage process and wait time.  Instructed to notify staff for worsening or new symptoms.  Placed in lobby.     "

## 2017-11-19 NOTE — DISCHARGE INSTRUCTIONS
Alergias  (Allergies)  Sushil alergia es sushil reacción anormal del sistema de defensa del cuerpo (sistema inmunitario) ante sushil sustancia. Las alergias pueden aparecer a cualquier edad.  ¿CUÁLES SON LAS CAUSAS DE LAS ALERGIAS?  La reacción alérgica se produce cuando el sistema inmunitario, por equivocación, reacciona ante sushil sustancia normalmente inocua, llamada alérgeno, bianca si fuera perjudicial. El sistema inmunitario libera anticuerpos para combatir la sustancia. Con el tiempo, los anticuerpos liberan sushil sustancia química llamada histamina en el torrente sanguíneo. La liberación de histamina tiene bianca fin proteger al cuerpo de la infección, medardo también causa molestias.  Cualquiera de estas acciones puede desencadenar sushil reacción alérgica:  · [place] un alérgeno.  · Inhalar un alérgeno.  · Tocar un alérgeno.  ¿CUÁLES SON LAS CLASES DE ALERGIAS?  Hay muchas clases de alergias. Entre las más frecuentes, se incluyen las siguientes:  · Alergias estacionales. Por lo general, esta clase de alergia se produce por sustancias que solo están presentes charles determinadas estaciones, por ejemplo, el moho y el polen.  · Alergias a los alimentos.  · Alergias a los medicamentos.  · Alergias a los insectos.  · Alergias a la caspa de los animales.  ¿CUÁLES SON LOS SÍNTOMAS DE LAS ALERGIAS?  Entre los posibles síntomas de la alergia, se incluyen los siguientes:  · Hinchazón de los labios, la tao, la lengua, la boca o la garganta.  · Estornudos, tos o sibilancias.  · Congestión nasal.  · Hormigueo en la boca.  · Erupción cutánea.  · Picazón.  · Zonas de piel hinchadas, marrero y que producen picazón (ronchas).  · Lagrimeo.  · Vómitos.  · Diarrea.  · Mareos.  · Sensación de desvanecimiento.  · Desmayos.  · Problemas para respirar o tragar.  · Opresión en el pecho.  · Latidos cardíacos rápidos.  ¿CÓMO SE DIAGNOSTICAN LAS ALERGIAS?  Las alergias se diagnostican en función de los antecedentes médicos y familiares, y mediante lexi o más  de estos elementos:  · Pruebas cutáneas.  · Análisis de edward.  · Un registro de alimentos. Un registro de alimentos incluye todos los alimentos y las bebidas que usted consume en un día, y todos los síntomas que experimenta.  · Los resultados de sushil dieta de eliminación. Sushil dieta de eliminación implica eliminar alimentos de la dieta y luego incorporarlos nuevamente, lexi a la vez, para averiguar si hay lexi en particular que le cause sushil reacción alérgica.  ¿CÓMO SE TRATAN LAS ALERGIAS?  No hay sushil ron para las alergias, medardo las reacciones alérgicas pueden tratarse con medicamentos. Generalmente, las reacciones graves deben tratarse en un hospital.  ¿CÓMO PUEDEN PREVENIRSE LAS REACCIONES?  La mejor manera de prevenir sushil reacción alérgica es evitar la sustancia que le causa alergia. Las vacunas y los medicamentos para la alergia también pueden ayudar a prevenir las reacciones en algunos casos. Las personas con reacciones alérgicas graves pueden prevenir sushil reacción potencialmente mortal llamada anafilaxis con la administración inmediata de un medicamento después de la exposición al alérgeno.     Esta información no tiene bianca fin reemplazar el consejo del médico. Asegúrese de hacerle al médico cualquier pregunta que tenga.     Document Released: 12/18/2006 Document Revised: 01/08/2016  Elsevier Interactive Patient Education ©2016 Elsevier Inc.      Ronchas   (Hives)   Las ronchas son áreas de la piel inflamadas (hinchadas) marrero y que pican. Pueden cambiar de tamaño y de ubicación en el cuerpo. Las ronchas pueden aparecer y desaparecer charles algunas horas o días (ronchas agudas) o charles algunas semanas (ronchas crónicas). No pueden transmitirse de sushil persona a otra (no son contagiosas). Pueden empeorar al rascarse, hacer ejercicios y por estrés emocional.   CAUSAS   · Reacción alérgica a alimentos, aditivos o fármacos.  · Infecciones, incluso el resfrío común.  · Enfermedades, bianca la vasculitis, el lupus o  la enfermedad tiroidea.  · Exposición al sol, al calor o al frío.  · La práctica de ejercicios.  · El estrés.  · El contacto con algunas sustancias químicas.  SÍNTOMAS   · Zonas hinchadas, marrero o laura, sobre la piel. Las ronchas pueden cambiar de tamaño, forma, ubicación y pueden desaparecer repentinamente.  · Picazón.  · Hinchazón de las greg los pies y el aroldo. [place] puede ocurrir si las ronchas se desarrollan en capas profundas de la piel.  DIAGNÓSTICO   El médico puede diagnosticar el problema haciendo un examen físico. Le indicará análisis de edward o un estudio de la piel para determinar la causa. En algunos casos, no puede determinarse la causa.   TRATAMIENTO   Los casos leves generalmente mejoran con medicamentos bianca los antihistamínicos. Los casos más graves pueden requerir sushil inyección de epinefrina de emergencia. Si se conoce la causa de la urticaria, el tratamiento incluye evitar el factor desencadenante.   INSTRUCCIONES PARA EL CUIDADO EN EL HOGAR   · Evite las causas que arias desencadenado las ronchas.  · [place]chid los antihistamínicos según las indicaciones del médico para reducir la gravedad de las ronchas. Generalmente se recomiendan los antihistamínicos que no son sedantes o con bajo efecto sedante. No conduzca vehículos mientras mariajose antihistamínicos.  · Orchid los medicamentos para la picazón exactamente bianca le indicó el médico.  · Use ropas sueltas.  · Cumpla con todas las visitas de control, según le indique parmar médico.  SOLICITE ATENCIÓN MÉDICA SI:   · Siente sushil picazón intensa o persistente que no se calma con los medicamentos.  · Le duelen las articulaciones o están inflamadas.  SOLICITE ATENCIÓN MÉDICA DE INMEDIATO SI:   · Tiene fiebre.  · Tiene la boca o los labios hinchados.  · Tiene problemas para respirar o tragar.  · Siente sushil opresión en la garganta o en el pecho.  · Siente dolor abdominal.  Estos problemas pueden ser los primeros signos de sushil reacción alérgica que ponga en peligro  la janee. Llame a los servicios de emergencia locales (911 en los [place]).  ASEGÚRESE DE QUE:   · Comprende estas instrucciones.  · Controlará parmar enfermedad.  · Solicitará ayuda de inmediato si no mejora o si empeora.     Esta información no tiene bianca fin reemplazar el consejo del médico. Asegúrese de hacerle al médico cualquier pregunta que tenga.     Document Released: 12/18/2006 Document Revised: 12/23/2014  Elsevier Interactive Patient Education ©2016 Elsevier Inc.

## 2017-11-20 LAB
BACTERIA UR CULT: ABNORMAL
BACTERIA UR CULT: ABNORMAL
SIGNIFICANT IND 70042: ABNORMAL
SITE SITE: ABNORMAL
SOURCE SOURCE: ABNORMAL

## 2017-11-22 NOTE — ED NOTES
ED Positive Culture Follow-up/Notification Note:    Date: 17     Patient seen in the ED on 2017 and 2017 for rash,  persistent pruritis, and the followin. Fever, unspecified fever cause    2. Urinary tract infection without hematuria, site unspecified    3. Nonintractable headache, unspecified chronicity pattern, unspecified headache type       Discharge Medication List as of 2017  1:59 AM      START taking these medications    Details   !! naproxen (NAPROSYN) 500 MG Tab Take 1 Tab by mouth 2 times a day, with meals for 10 days., Disp-20 Tab, R-0, Print Rx Paper      cefdinir (OMNICEF) 300 MG Cap Take 1 Cap by mouth 2 times a day., Disp-20 Cap, R-0, Print Rx Paper      !! naproxen (NAPROSYN) 500 MG Tab Take 1 Tab by mouth 2 times a day, with meals for 10 days., Disp-20 Tab, R-0, Print Rx Paper       !! - Potential duplicate medications found. Please discuss with provider.          Allergies: Nkda [no known drug allergy]     Final cultures:   Results     Procedure Component Value Units Date/Time    URINE CULTURE(NEW) [[id number]]  (Abnormal)  (Susceptibility) Collected:  17 4272    Order Status:  Completed Specimen:  Urine Updated:  17 0953     Significant Indicator POS (POS)     Source UR     Site --     Urine Culture -- (A)     Urine Culture -- (A)     Escherichia coli  >100,000 cfu/mL      Narrative:       Indication for culture:->Emergency Room Patient    Culture & Susceptibility     ESCHERICHIA COLI     Antibiotic Sensitivity Microscan Unit Status    Ampicillin Resistant >16 mcg/mL Final    Cefepime Sensitive <=8 mcg/mL Final    Cefotaxime Sensitive <=2 mcg/mL Final    Cefotetan Sensitive <=16 mcg/mL Final    Ceftazidime Sensitive <=1 mcg/mL Final    Ceftriaxone Sensitive <=8 mcg/mL Final    Cefuroxime Sensitive <=4 mcg/mL Final    Cephalothin Intermediate 16 mcg/mL Final    Ciprofloxacin Sensitive <=1 mcg/mL Final    Gentamicin Sensitive <=4 mcg/mL Final    Levofloxacin  "Sensitive <=2 mcg/mL Final    Nitrofurantoin Sensitive <=32 mcg/mL Final    Pip/Tazobactam Sensitive <=16 mcg/mL Final    Piperacillin Resistant >64 mcg/mL Final    Tigecycline Sensitive <=2 mcg/mL Final    Tobramycin Sensitive <=4 mcg/mL Final    Trimeth/Sulfa Sensitive <=2/38 mcg/mL Final                       BLOOD CULTURE [[id number]] Collected:  11/17/17 2348    Order Status:  Completed Specimen:  Blood from Peripheral Updated:  11/19/17 0901     Significant Indicator NEG     Source BLD     Site PERIPHERAL     Blood Culture --     No Growth    Note: Blood cultures are incubated for 5 days and  are monitored continuously.Positive blood cultures  are called to the RN and reported as soon as  they are identified.      Narrative:       Per Hospital Policy: Only change Specimen Src: to \"Line\" if  specified by physician order.    BLOOD CULTURE [[id number]] Collected:  11/17/17 2252    Order Status:  Completed Specimen:  Blood from Peripheral Updated:  11/18/17 0919     Significant Indicator NEG     Source BLD     Site PERIPHERAL     Blood Culture --     No Growth    Note: Blood cultures are incubated for 5 days and  are monitored continuously.Positive blood cultures  are called to the RN and reported as soon as  they are identified.      Narrative:       Per Hospital Policy: Only change Specimen Src: to \"Line\" if  specified by physician order.    URINALYSIS [[id number]]  (Abnormal) Collected:  11/17/17 2252    Order Status:  Completed Specimen:  Urine Updated:  11/18/17 0030     Color Yellow     Character Cloudy (A)     Specific Gravity 1.008     Ph 6.0     Glucose Negative mg/dL      Ketones Negative mg/dL      Protein Negative mg/dL      Bilirubin Negative     Urobilinogen, Urine 1.0     Nitrite Positive (A)     Leukocyte Esterase Small (A)     Occult Blood Small (A)     Micro Urine Req Microscopic    Narrative:       Indication for culture:->Emergency Room Patient          Plan:   Pt stopped amoxicillin for otitis " media as recommended by ERP on 11/17. She re-presented on 11/19 with CC of intense pruritis.   -Attempted to contact pt to assess her clinical status. Pt's  stated pt was not home; however, he volunteered information stating pt's reaction was subsiding.  -Requested pt returns my call so that I may speak to her directly.  agreed to relay the message.  -Cefdinir x10 days is appropriate for otitis media and the organism incidentally found in the urine will also be covered with the prescribed abx.   -No further follow up indicated with respect to the urine culture obtained on 11/17.    Becca Baldwin

## 2017-11-23 LAB
BACTERIA BLD CULT: NORMAL
BACTERIA BLD CULT: NORMAL
SIGNIFICANT IND 70042: NORMAL
SIGNIFICANT IND 70042: NORMAL
SITE SITE: NORMAL
SITE SITE: NORMAL
SOURCE SOURCE: NORMAL
SOURCE SOURCE: NORMAL

## 2017-12-03 ENCOUNTER — HOSPITAL ENCOUNTER (EMERGENCY)
Facility: MEDICAL CENTER | Age: 42
End: 2017-12-03
Attending: EMERGENCY MEDICINE
Payer: COMMERCIAL

## 2017-12-03 VITALS
RESPIRATION RATE: 17 BRPM | WEIGHT: 160.72 LBS | HEIGHT: 64 IN | TEMPERATURE: 97.8 F | BODY MASS INDEX: 27.44 KG/M2 | OXYGEN SATURATION: 97 % | SYSTOLIC BLOOD PRESSURE: 138 MMHG | DIASTOLIC BLOOD PRESSURE: 80 MMHG | HEART RATE: 85 BPM

## 2017-12-03 DIAGNOSIS — R21 RASH: ICD-10-CM

## 2017-12-03 PROCEDURE — 700102 HCHG RX REV CODE 250 W/ 637 OVERRIDE(OP): Performed by: EMERGENCY MEDICINE

## 2017-12-03 PROCEDURE — 99284 EMERGENCY DEPT VISIT MOD MDM: CPT

## 2017-12-03 PROCEDURE — A9270 NON-COVERED ITEM OR SERVICE: HCPCS | Performed by: EMERGENCY MEDICINE

## 2017-12-03 RX ORDER — HYDROXYZINE HYDROCHLORIDE 25 MG/1
25 TABLET, FILM COATED ORAL ONCE
Status: COMPLETED | OUTPATIENT
Start: 2017-12-03 | End: 2017-12-03

## 2017-12-03 RX ORDER — DEXAMETHASONE 4 MG/1
8 TABLET ORAL ONCE
Status: COMPLETED | OUTPATIENT
Start: 2017-12-03 | End: 2017-12-03

## 2017-12-03 RX ORDER — HYDROXYZINE HYDROCHLORIDE 25 MG/1
25 TABLET, FILM COATED ORAL 3 TIMES DAILY PRN
Qty: 30 TAB | Refills: 0 | Status: SHIPPED | OUTPATIENT
Start: 2017-12-03 | End: 2020-04-18

## 2017-12-03 RX ORDER — HYDROXYZINE HYDROCHLORIDE 25 MG/1
25 TABLET, FILM COATED ORAL 3 TIMES DAILY PRN
Qty: 30 TAB | Refills: 0 | Status: SHIPPED | OUTPATIENT
Start: 2017-12-03 | End: 2017-12-03

## 2017-12-03 RX ADMIN — DEXAMETHASONE 8 MG: 4 TABLET ORAL at 20:15

## 2017-12-03 RX ADMIN — HYDROXYZINE HYDROCHLORIDE 25 MG: 25 TABLET, FILM COATED ORAL at 20:15

## 2017-12-04 NOTE — DISCHARGE INSTRUCTIONS
Erupción cutánea  (Rash)   Sushil erupción es un cambio en el color o en la textura de la piel. Hay diferentes tipos de erupción. Puede ser que tenga otros síntomas que acompañan la erupción.   CAUSAS   · Infecciones.  · Reacciones alérgicas. [place] incluye alergias a mascotas o a medicamentos.  · Ciertos medicamentos.  · Exposición a ciertas sustancias químicas, jabones o cosméticos.  · El calor.  · Exposición a plantas venenosas.  · Tumores, tanto cancerosos bianca no cancerosos.  SÍNTOMAS   · Enrojecimiento.  · Piel escamosa.  · Picazón en la piel.  · Piel seca o agrietada.  · Bultos.  · Ampollas.  · Dolor.  DIAGNÓSTICO   El médico hará un examen físico para determinar qué tipo de erupción tiene. Podrán tomarle sushil muestra de piel (biopsia) para ser examinada en el microscopio.   TRATAMIENTO   El tratamiento depende del tipo de erupción que usted tenga. El médico puede prescribirle algunos medicamentos. En los casos graves, necesitará ernestine a un médico especialista en piel (dermatólogo).   INSTRUCCIONES PARA EL CUIDADO DOMICILIARIO  · Evite las sustancias que arias causado la erupción.  · No se rasque la lesión. Puede ocasionarle sushil infección.  · [place] caitlyn con agua fresca para detener la picazón.  · [place] sólo medicamentos de venta krista o recetados, según las indicaciones del médico.  · Cumpla con todas las visitas de control, según le indique parmar médico.  SOLICITE ATENCIÓN MÉDICA DE INMEDIATO SI:  · El dolor, la hinchazón o el enrojecimiento aumentan.  · Tiene fiebre.  · Tiene síntomas nuevos o graves.  · Siente dolor en el cuerpo, diarrea o vómitos.  · La erupción no mejora en el término de 3 días.  ASEGÚRESE DE QUE:   · Comprende estas instrucciones.  · Controlará parmar enfermedad.  · Solicitará ayuda de inmediato si no mejora o si empeora.     Esta información no tiene bianca fin reemplazar el consejo del médico. Asegúrese de hacerle al médico cualquier pregunta que tenga.     Document Released: 09/27/2006 Document Revised:  09/11/2013  Elsevier Interactive Patient Education ©2016 [employer] Inc[employer]

## 2017-12-04 NOTE — ED PROVIDER NOTES
ED Provider Note    Scribed for Karla Raymond M.D. by Jaclyn Grace. 12/3/2017, 7:39 PM.    Primary care provider: Pcp Pt States None  Means of arrival: Walk-in  History obtained from: Patient  History limited by: None    CHIEF COMPLAINT  Chief Complaint   Patient presents with   • Allergic Reaction   • Rash       HPI  Marce Johnson is a 42 y.o. female who presents to the Emergency Department for evaluation of generalized body rash and allergic reaction onset three weeks ago. Per nurse's note, the patient was seen on 11/19/17 for similar symptoms and was sent home on a course of Prednisone, Pepsid, and Benadryl which did not alleviate her symptoms. She reports that the rash began after taking Amoxicillin for an ear infection. The patient denies living anywhere new or using new soap, laundry detergent, shampoo. She also does not have any pets at home. Per patient, she has not been experiencing any wheezing or oral swelling.    REVIEW OF SYSTEMS  Pertinent positives include rash, allergic reaction. Pertinent negatives include no wheezing or oral swelling.  E.    PAST MEDICAL HISTORY    The patient has no chronic medical history.    SURGICAL HISTORY   has a past surgical history that includes primary c section (x 3 ); repeat c section w tubal ligation (2/6/2011); and breast biopsy.    SOCIAL HISTORY  Social History   Substance Use Topics   • Smoking status: Never Smoker   • Smokeless tobacco: Never Used   • Alcohol use No      History   Drug Use No       FAMILY HISTORY  Family History   Problem Relation Age of Onset   • Hypertension Maternal Grandmother        CURRENT MEDICATIONS  Home Medications     Reviewed by Sybil Martinez R.N. (Registered Nurse) on 12/03/17 at 1934  Med List Status: Complete   Medication Last Dose Status   cefdinir (OMNICEF) 300 MG Cap > 2 weeks ago Active   cetirizine (ZYRTEC) 10 MG Tab >1 week Active   diphenhydrAMINE (BENADRYL) 25 MG capsule >1 week Active   docusate  "sodium (COLACE) 100 MG CAPS  Active   ibuprofen (MOTRIN) 800 MG TABS  Active                ALLERGIES  Allergies   Allergen Reactions   • Amoxicillin Rash     generalized   • Nkda [No Known Drug Allergy]        PHYSICAL EXAM  VITAL SIGNS: /75   Pulse 85   Temp 36.2 °C (97.1 °F)   Resp 16   Ht 1.626 m (5' 4\")   Wt 72.9 kg (160 lb 11.5 oz)   LMP 11/10/2017 (Approximate)   SpO2 100%   BMI 27.59 kg/m²     Constitutional: Patient is sitting in gurney talking to family, Alert and in no apparent distress.  HENT: Normocephalic, Bilateral external ears normal. Nose normal. No oral lesions.  Eyes: Pupils are equal and reactive. Conjunctiva normal, non-icteric.   Cardiovascular:  Good Perfusion  Thorax & Lungs: Easy unlabored respirations. No wheezing.  Abdomen:  No gross signs of peritonitis, no pain with movement   Skin: Excoriated rash noted faintly on arms, legs and upper chest. Nothing on central part of torso, front or back. No hives.  Extremities:   Moves all extremities   Neurologic: Alert, Grossly non-focal.   Psychiatric: Appropriate Mood      COURSE & MEDICAL DECISION MAKING  Nursing notes and vital signs were reviewed. (See chart for details) History was obtained form patient.    The patient presents with allergic reaction and rash.    7:39 PM Initial treatment in the Emergency Department included 8 mg Decadron tablet and 25 mg Atarax tablet. I informed the patient that I will discharge her home on a new medication which should alleviate her rashes.    The patient was discharged home with an information sheet on rash and told to return immediately for any signs or symptoms listed, but specifically if rash worsens or she develops a fever.  The patient agreed to the discharge precautions and follow-up plan which is documented in [facility].    The patient will return for new or worsening symptoms and is stable at the time of discharge.    The patient is referred to a primary physician for blood pressure " management, diabetic screening, and for all other preventative health concerns.    DISPOSITION:  Patient will be discharged home in stable condition.    FOLLOW UP:  Mario Noriega M.D.  [address]  [place] [address]  [phone]    Schedule an appointment as soon as possible for a visit in 1 day  for further evaluation      OUTPATIENT MEDICATIONS:  New Prescriptions    HYDROXYZINE HCL (ATARAX) 25 MG TAB    Take 1 Tab by mouth 3 times a day as needed for Itching.        FINAL IMPRESSION  1. Rash          Jaclyn FARRAR (Elan), am scribing for, and in the presence of, Karla Raymond M.D..    Electronically signed by: Jaclyn Grace (Elan), 12/3/2017    Karla FARRAR M.D. personally performed the services described in this documentation, as scribed by Jaclyn Grace in my presence, and it is both accurate and complete.    The note accurately reflects work and decisions made by me.  Karla Raymond  12/3/2017  8:09 PM

## 2017-12-04 NOTE — ED NOTES
Pt to ED with complaints of generalized body rash x3 weeks. Seen for same 11/19 and sent home on short course of prednisone, Pepcid and benadryl. She states no improvement in rash. Rash originally developed after amoxicillin PO for ear infection and when she developed rash was changed to omnifec. She has completed that prescriptions >1 weeks ago. She has not wheezing or oral swelling.

## 2017-12-04 NOTE — ED NOTES
Discharge instructions given. All questions answered. Prescriptions given x1. Pt to follow-up with PCP/ dermatology. Pt verbalized understanding. All belongings with pt. Pt to lobby self ambulatory with family.

## 2017-12-04 NOTE — ED NOTES
Pt to room. Self ambulatory to room. Pt is Serbian speaking only. Agree with triage RN assessment.

## 2020-02-15 ENCOUNTER — OFFICE VISIT (OUTPATIENT)
Dept: URGENT CARE | Facility: PHYSICIAN GROUP | Age: 45
End: 2020-02-15
Payer: COMMERCIAL

## 2020-02-15 VITALS
RESPIRATION RATE: 16 BRPM | OXYGEN SATURATION: 100 % | TEMPERATURE: 97.9 F | SYSTOLIC BLOOD PRESSURE: 120 MMHG | HEART RATE: 86 BPM | WEIGHT: 176 LBS | HEIGHT: 65 IN | DIASTOLIC BLOOD PRESSURE: 78 MMHG | BODY MASS INDEX: 29.32 KG/M2

## 2020-02-15 DIAGNOSIS — R39.9 SYMPTOMS INVOLVING URINARY SYSTEM: ICD-10-CM

## 2020-02-15 DIAGNOSIS — L30.9 ECZEMA, UNSPECIFIED TYPE: ICD-10-CM

## 2020-02-15 DIAGNOSIS — N30.01 ACUTE CYSTITIS WITH HEMATURIA: ICD-10-CM

## 2020-02-15 LAB
APPEARANCE UR: NORMAL
BILIRUB UR STRIP-MCNC: NORMAL MG/DL
COLOR UR AUTO: YELLOW
GLUCOSE UR STRIP.AUTO-MCNC: NORMAL MG/DL
KETONES UR STRIP.AUTO-MCNC: NORMAL MG/DL
LEUKOCYTE ESTERASE UR QL STRIP.AUTO: NORMAL
NITRITE UR QL STRIP.AUTO: NORMAL
PH UR STRIP.AUTO: 6 [PH] (ref 5–8)
PROT UR QL STRIP: NORMAL MG/DL
RBC UR QL AUTO: NORMAL
SP GR UR STRIP.AUTO: 1.01
UROBILINOGEN UR STRIP-MCNC: NORMAL MG/DL

## 2020-02-15 PROCEDURE — 81002 URINALYSIS NONAUTO W/O SCOPE: CPT | Performed by: EMERGENCY MEDICINE

## 2020-02-15 PROCEDURE — 99203 OFFICE O/P NEW LOW 30 MIN: CPT | Performed by: EMERGENCY MEDICINE

## 2020-02-15 RX ORDER — PERMETHRIN 50 MG/G
CREAM TOPICAL ONCE
COMMUNITY
End: 2020-04-18

## 2020-02-15 RX ORDER — TRIAMCINOLONE ACETONIDE 1 MG/G
CREAM TOPICAL
COMMUNITY
Start: 2020-01-30

## 2020-02-15 RX ORDER — NITROFURANTOIN 25; 75 MG/1; MG/1
100 CAPSULE ORAL EVERY 12 HOURS
Qty: 10 CAP | Refills: 0 | Status: SHIPPED | OUTPATIENT
Start: 2020-02-15 | End: 2020-02-20

## 2020-02-15 RX ORDER — MONTELUKAST SODIUM 10 MG/1
10 TABLET ORAL
COMMUNITY
Start: 2020-01-30

## 2020-02-15 ASSESSMENT — ENCOUNTER SYMPTOMS
CONSTIPATION: 0
FEVER: 0
CHILLS: 1
FLANK PAIN: 0
ABDOMINAL PAIN: 1
VOMITING: 0
ROS GI COMMENTS: SUPRAPUBIC PRESSURE
DIARRHEA: 0
COUGH: 0
NAUSEA: 0

## 2020-02-15 NOTE — PROGRESS NOTES
Subjective:      Marce Johnson is a 44 y.o. female who presents with Abdominal Pain (began last night, hurts to go to the bathroom and only a little bit of urine )            Dysuria    This is a new problem. The current episode started yesterday. The problem occurs every urination. The quality of the pain is described as burning. The pain is moderate. There is no history of pyelonephritis. Associated symptoms include chills. Pertinent negatives include no discharge, flank pain, frequency, hematuria, nausea, possible pregnancy, urgency or vomiting. She has tried nothing for the symptoms.   Rash   This is a recurrent problem. Episode onset: over 2 weeks. The problem is unchanged. The affected locations include the left arm, right arm, right lower leg and left lower leg. The rash is characterized by redness and itchiness. It is unknown if there was an exposure to a precipitant. Pertinent negatives include no congestion, cough, diarrhea, fever, joint pain or vomiting. Treatments tried: permethrin. The treatment provided no relief. Her past medical history is significant for eczema.   PSH TL    Review of Systems   Constitutional: Positive for chills. Negative for fever.   HENT: Negative for congestion.    Respiratory: Negative for cough.    Gastrointestinal: Positive for abdominal pain. Negative for constipation, diarrhea, nausea and vomiting.        Suprapubic pressure   Genitourinary: Positive for dysuria. Negative for flank pain, frequency, hematuria and urgency.        No vaginal discharge or bleeding. Denies sexually transmitted disease exposure or risk.   Musculoskeletal: Negative for joint pain.   Skin: Positive for itching and rash.       History reviewed. No pertinent past medical history.   Allergy:  Amoxicillin and Nkda [no known drug allergy]     Current Outpatient Medications:   •  montelukast, Take 10 mg by mouth., Taking  •  permethrin, Apply  to affected area(s) Once., Taking  •  nitrofurantoin  "monohyd macro, 100 mg, Oral, Q12HRS  •  hydrOXYzine HCl, 25 mg, Oral, TID PRN, Taking  •  diphenhydrAMINE, 25-50 mg, Oral, Q6HRS PRN, PRN  •  ibuprofen, 800 mg, Oral, Q8HRS PRN, Taking  •  triamcinolone acetonide, APPLY CREAM EXTERNALLY TO AFFECTED AREA TWICE DAILY, Not Taking  •  cetirizine, 10 mg, Oral, DAILY (Patient not taking: Reported on 2/15/2020), Not Taking  •  cefdinir, 300 mg, Oral, BID (Patient not taking: Reported on 2/15/2020), Not Taking  •  docusate sodium, 100 mg, Oral, BID PRN, Not Taking   family history includes Hypertension in her maternal grandmother.   Social History     Tobacco Use   • Smoking status: Never Smoker   • Smokeless tobacco: Never Used   Substance Use Topics   • Alcohol use: No   • Drug use: No       Objective:     /78 (BP Location: Right arm, Patient Position: Sitting, BP Cuff Size: Adult)   Pulse 86   Temp 36.6 °C (97.9 °F) (Tympanic)   Resp 16   Ht 1.651 m (5' 5\")   Wt 79.8 kg (176 lb)   SpO2 100%   Breastfeeding No   BMI 29.29 kg/m²      Physical Exam  Constitutional:       General: She is not in acute distress.     Appearance: She is well-developed. She is not ill-appearing.   Cardiovascular:      Rate and Rhythm: Normal rate and regular rhythm.      Heart sounds: Normal heart sounds.   Pulmonary:      Effort: Pulmonary effort is normal.      Breath sounds: Normal breath sounds.   Abdominal:      General: There is no distension.      Palpations: Abdomen is soft.      Tenderness: There is abdominal tenderness in the suprapubic area. There is no right CVA tenderness, left CVA tenderness, guarding or rebound.   Skin:     General: Skin is warm and dry.      Findings: Rash present. Rash is papular.      Comments: Discrete papular areas bilateral dorsal distal arms, anterior lower legs, with some excoriation.  No evidence of secondary infection   Neurological:      Mental Status: She is alert and oriented to person, place, and time.   Psychiatric:         Behavior: " Behavior is cooperative.                 Assessment/Plan:       1. Acute cystitis with hematuria  Recommended supportive care measures, including rest, increasing oral fluid intake and use of over-the-counter medications for relief of symptoms.  - nitrofurantoin monohyd macro (MACROBID) 100 MG Cap; Take 1 Cap by mouth every 12 hours for 5 days.  Dispense: 10 Cap; Refill: 0    2. Symptoms involving urinary system  Positive blood, LE- POCT Urinalysis    3. Eczema, unspecified type  OTC hydrocortisone, cetirizine.  CeraVe topical

## 2020-02-15 NOTE — PATIENT INSTRUCTIONS
You may use over-the-counter 0.1% hydrocortisone to the area(s) once or twice daily as needed.  Consider use of topical barrier moisturizer, such as CeraVe, daily.  Use over-the-counter levocetirizine (Xyxal), cetirizine (Zyrtec), fexofenadine (Allegra), or loratadine (Claritin) as needed for relief of itch symptoms.    Eczema  (Eczema)  El eczema, también llamada dermatitis atópica, es sushil afección de la piel que causa inflamación de la misma. Luis Armando trastorno produce sushil erupción shelby y sequedad y escamas en la piel. Hay gran picazón. El eczema generalmente empeora charles los meses fríos del invierno y generalmente desaparece o mejora con el tiempo cálido del verano. El eczema generalmente comienza a manifestarse en la infancia. Algunos niños desarrollan luis armando trastorno y éste puede prolongarse en la adultez.  CAUSAS  La causa exacta no se conoce medardo parece ser sushil afección hereditaria. Generalmente las personas que sufren eczema tienen sushil historia familiar de eczema, alergias, asma o fiebre de heno. Esta enfermedad no es contagiosa.  Algunas causas de los brotes pueden ser:  · Contacto con alguna cosa a la que es sensible o alérgico.  · Estrés.  SIGNOS Y SÍNTOMAS  · Piel seca y escamosa.  · Erupción shelby y que pica.  · Picazón. Esta puede ocurrir antes de que aparezca la erupción y puede ser muy intensa.  DIAGNÓSTICO  El diagnóstico de eczema se realiza basándose en los síntomas y en la historia clínica.  TRATAMIENTO  El eczema no puede curarse, medardo los síntomas generalmente pueden controlarse con tratamiento y otras estrategias. Un plan de tratamiento puede incluir:  · Control de la picazón y el rascado.  ¨ Utilice antihistamínicos de venta krista según las indicaciones, para aliviar la picazón. Es especialmente útil por las noches cuando la picazón tiende a empeorar.  ¨ Utilice medicamentos de venta krista para la picazón, según las indicaciones del médico.  ¨ Evite rascarse. El rascado hace que la picazón  empeore. También puede producir sushil infección en la piel (impétigo) debido a las lesiones en la piel causadas por el rascado.  · Mantenga la piel krystal humectada con cremas, todos los regan. La piel quedará húmeda y ayudará a prevenir la sequedad. Las lociones que contengan alcohol y agua deben evitarse debido a que pueden secar la piel.  · Limite la exposición a las cosas a las que es sensible o alérgico (alérgenos).  · Reconozca las situaciones que puedan causar estrés.  · Desarrolle un plan para controlar el estrés.  INSTRUCCIONES PARA EL CUIDADO EN EL HOGAR  · [place] sólo medicamentos de venta krista o recetados, según las indicaciones del médico.  · No aplique nada sobre la piel sin consultar a parmar médico.  · [place] purnima caitlyn o duchas de corta duración (5 minutos) en agua tibia (no caliente). Use jabones suaves para el baño. No deben tener perfume. Puede agregar aceite de baño no perfumado al agua del baño. Es mejor evitar el jabón y el baño de espuma.  · Inmediatamente después del baño o de la ducha, cuando la piel aun está húmeda, aplique sushil crema humectante en todo el cuerpo. Modesta ungüento debe ser en base a vaselina. La piel quedará húmeda y ayudará a prevenir la sequedad. Cuanto más espeso sea el ungüento, mejor. No deben tener perfume.  · Mantenga las uñas cortas. Es posible que los niños con eczema necesiten usar guantes o mitones por la noche, después de aplicarse el ungüento.  · [place] al ginger con ropa de algodón o mezcla de algodón. Vístalo con ropas ligeras ya que el calor aumenta la picazón.  · Un ginger con eczema debe permanecer alejado de personas que tengan ampollas febriles o llagas del resfrío. El virus que causa las ampollas febriles (herpes simple) puede ocasionar sushil infección grave en la piel de los niños que padecen eczema.  SOLICITE ATENCIÓN MÉDICA SI:  · La picazón le impide dormir.  · La erupción empeora o no mejora dentro de la semana en la que se inicia el tratamiento.  · Observa pus o  costras evan en la daniel de la erupción.  · Tiene fiebre.  · Aparece un brote después de dia estado en contacto con alguna persona que tiene ampollas febriles.  Esta información no tiene bianca fin reemplazar el consejo del médico. Asegúrese de hacerle al médico cualquier pregunta que tenga.  Document Released: 12/18/2006 Document Revised: 10/08/2014 Document Reviewed: 07/21/2014  Elsevier Interactive Patient Education © 2017 [employer]  Atopic Dermatitis  Atopic dermatitis is a skin disorder that causes inflammation of the skin. This is the most common type of eczema. Eczema is a group of skin conditions that cause the skin to be itchy, red, and swollen. This condition is generally worse during the cooler winter months and often improves during the warm summer months. Symptoms can vary from person to person.  Atopic dermatitis usually starts showing signs in infancy and can last through adulthood. This condition cannot be passed from one person to another (non-contagious), but is more common in families. Atopic dermatitis may not always be present. When it is present, it is called a flare-up.  What are the causes?  The exact cause of this condition is not known. Flare-ups of the condition may be triggered by:  · Contact with something you are sensitive or allergic to.  · Stress.  · Certain foods.  · Extremely hot or cold weather.  · Harsh chemicals and soaps.  · Dry air.  · Chlorine.  What increases the risk?  This condition is more likely to develop in people who have a personal history or family history of eczema, allergies, asthma, or hay fever.  What are the signs or symptoms?  Symptoms of this condition include:  · Dry, scaly skin.  · Red, itchy rash.  · Itchiness, which can be severe. This may occur before the skin rash. This can make sleeping difficult.  · Skin thickening and cracking can occur over time.  How is this diagnosed?  This condition is diagnosed based on your symptoms, a medical history,  and a physical exam.  How is this treated?  There is no cure for this condition, but symptoms can usually be controlled. Treatment focuses on:  · Controlling the itching and scratching. You may be given medicines, such as antihistamines or steroid creams.  · Limiting exposure to things that you are sensitive or allergic to (allergens).  · Recognizing situations that cause stress and developing a plan to manage stress.  If your atopic dermatitis does not get better with medicines or is all over your body (widespread) , a treatment using a specific type of light (phototherapy) may be used.  Follow these instructions at home:  Skin care  · Keep your skin well-moisturized. This seals in moisture and help prevent dryness.  ¨ Use unscented lotions that have petroleum in them.  ¨ Avoid lotions that contain alcohol and water. They can dry the skin.  · Keep baths or showers short (less than 5 minutes) in warm water. Do not use hot water.  ¨ Use mild, unscented cleansers for bathing. Avoid soap and bubble bath.  ¨ Apply a moisturizer to your skin right after a bath or shower.  ·  Do not apply anything to your skin without checking with your health care provider.  General instructions  · Dress in clothes made of cotton or cotton blends. Dress lightly because heat increases itching.  · When washing your clothes, rinse your clothes twice so all of the soap is removed.  · Avoid any triggers that can cause a flare-up.  · Try to manage your stress.  · Keep your fingernails cut short.  · Avoid scratching. Scratching makes the rash and itching worse. It may also result in a skin infection (impetigo) due to a break in the skin caused by scratching.  · Take or apply over-the-counter and prescription medicines only as told by your health care provider.  · Keep all follow-up visits as told by your health care provider. This is important.  · Do not be around people who have cold sores or fever blisters. If you get the infection, it may  cause your atopic dermatitis to worsen.  Contact a health care provider if:  · Your itching interferes with sleep.  · Your rash gets worse or is not better within one week of starting treatment.  · You have a fever.  · You have a rash flare-up after having contact with someone who has cold sores or fever blisters.  Get help right away if:  · You develop pus or soft yellow scabs in the rash area.  Summary  · This condition causes a red rash and itchy, dry, scaly skin.  · Treatment focuses on controlling the itching and scratching, limiting exposure to things that you are sensitive or allergic to (allergens), and recognizing situations that cause stress and developing a plan to manage stress.  · Keep your skin well-moisturized.  · Keep baths or showers less than 5 minutes.  This information is not intended to replace advice given to you by your health care provider. Make sure you discuss any questions you have with your health care provider.  Document Released: 12/15/2001 Document Revised: 05/25/2017 Document Reviewed: 07/21/2014  Elsevier Interactive Patient Education © 2017 [employer]  Infección de las vías urinarias en los adultos  (Urinary Tract Infection, Adult)  Irina infección urinaria (IU) es irina infección en cualquier parte de las vías urinarias, que incluyen los riñones, los uréteres, la vejiga y la uretra. Estos órganos fabrican, almacenan y eliminan la orina del organismo. La IU puede ser irina infección de la vejiga (cistitis) o infección de los riñones (pielonefritis).  CAUSAS  Esta infección puede ser causada por hongos, virus o bacterias. Las bacterias son las causas más comunes de las IU. Esta afección también puede ser provocada por no vaciar la vejiga por completo charles la micción en repetidas ocasiones.  FACTORES DE RIESGO  Es más probable que esta afección se manifieste si:  · Usted ignora la necesidad de orinar o retiene la orina charles largos períodos.  · No vacía la vejiga completamente  charles la micción.  · Se limpia de atrás hacia adelante después de orinar o defecar, en el josiah de que sea ezekiel.  · Está circuncidado, en el josiah de que sea varón.  · Tiene estreñimiento.  · Tiene colocada sushil sonda urinaria permanente.  · Tiene debilitado el sistema de defensa (inmunitario) del cuerpo.  · Tiene sushil enfermedad que afecta los intestinos, los riñones o la vejiga.  · Tiene diabetes.  · Lala antibióticos con frecuencia o charles largos períodos, y los antibióticos ya no resultan eficaces para combatir algunos tipos de infecciones (resistencia a los antibióticos).  · Lala medicamentos que le irritan las vías urinarias.  · Está expuesto a sustancias químicas que le irritan las vías urinarias.  · Es ezekiel.  SÍNTOMAS  Los síntomas de esta afección incluyen lo siguiente:  · Fiebre.  · Micción frecuente o eliminación de pequeñas cantidades de orina con frecuencia.  · Necesidad urgente de orinar.  · Ardor o dolor al orinar.  · Orina con mal olor u olor atípico.  · Orina turbia.  · Dolor en la parte baja del abdomen o en la espalda.  · Dificultad para orinar.  · Edward en la orina.  · Vómitos o más apetito de lo normal.  · Diarrea o dolor abdominal.  · Secreción vaginal, si es ezekiel.  DIAGNÓSTICO  Esta afección se diagnostica mediante alicia antecedentes médicos y un examen físico. También deberá proporcionar sushil muestra de orina para realizar análisis. Podrán indicarle otros estudios, por ejemplo:  · Análisis de edward.  · Pruebas de detección de enfermedades de transmisión sexual (ETS).  Si ha tenido más de sushil IU, se pueden hacer estudios de diagnóstico por imágenes o sushil citoscopia para determinar la causa de las infecciones.  TRATAMIENTO  El tratamiento de esta afección suele incluir sushil combinación de dos o más de los siguientes:  · Antibióticos.  · Otros medicamentos para tratar las causas menos frecuentes de infección urinaria.  · Medicamentos de venta krista para aliviar el dolor.  · Consumo de la  cantidad necesaria de agua para mantenerse hidratado.  INSTRUCCIONES PARA EL CUIDADO EN EL HOGAR  · [place] los medicamentos de venta krista y los recetados solamente bianca se lo haya indicado el médico.  · Si le recetaron un antibiótico, tómelo bianca se lo haya indicado el médico. No deje de purnima los antibióticos aunque comience a sentirse mejor.  · Evite el alcohol, la cafeína, el té y las bebidas gaseosas. Estas sustancias pueden irritar la vejiga.  · Elisabet suficiente líquido para mantener la orina tamie o de color amarillo pálido.  · Concurra a todas las visitas de control bianca se lo haya indicado el médico. [place] es importante.  · Asegúrese de lo siguiente:  ¨ Vaciar la vejiga con frecuencia y en parmar totalidad. No contener la orina charles largos períodos.  ¨ Vaciar la vejiga antes y después de tener relaciones sexuales.  ¨ Limpiar de adelante hacia atrás después de defecar, si es ezekiel. Usar cada trozo de papel sushil vez cuando se limpie.  SOLICITE ATENCIÓN MÉDICA SI:  · Siente dolor en la espalda.  · Tiene fiebre.  · Siente náuseas o vomita.  · Los síntomas no mejoran después de 3 días de tratamiento.  · Los síntomas desaparecen y luego reaparecen.  SOLICITE ATENCIÓN MÉDICA DE INMEDIATO SI:  · Siente dolor intenso en la espalda o en la daniel inferior del abdomen.  · Tiene vómitos y no puede tragar medicamentos ni agua.  Esta información no tiene bianca fin reemplazar el consejo del médico. Asegúrese de hacerle al médico cualquier pregunta que tenga.  Document Released: 09/27/2006 Document Revised: 04/10/2017 Document Reviewed: 11/07/2016  [employer] Interactive Patient Education © 2017 [employer] Inc[employer]  Urinary Tract Infection, Adult  Introduction  A urinary tract infection (UTI) is an infection of any part of the urinary tract. The urinary tract includes the:  · Kidneys.  · Ureters.  · Bladder.  · Urethra.  These organs make, store, and get rid of pee (urine) in the body.  Follow these instructions at home:  · Take  over-the-counter and prescription medicines only as told by your doctor.  · If you were prescribed an antibiotic medicine, take it as told by your doctor. Do not stop taking the antibiotic even if you start to feel better.  · Avoid the following drinks:  ¨ Alcohol.  ¨ Caffeine.  ¨ Tea.  ¨ Carbonated drinks.  · Drink enough fluid to keep your pee clear or pale yellow.  · Keep all follow-up visits as told by your doctor. This is important.  · Make sure to:  ¨ Empty your bladder often and completely. Do not to hold pee for long periods of time.  ¨ Empty your bladder before and after sex.  ¨ Wipe from front to back after a bowel movement if you are female. Use each tissue one time when you wipe.  Contact a doctor if:  · You have back pain.  · You have a fever.  · You feel sick to your stomach (nauseous).  · You throw up (vomit).  · Your symptoms do not get better after 3 days.  · Your symptoms go away and then come back.  Get help right away if:  · You have very bad back pain.  · You have very bad lower belly (abdominal) pain.  · You are throwing up and cannot keep down any medicines or water.  This information is not intended to replace advice given to you by your health care provider. Make sure you discuss any questions you have with your health care provider.  Document Released: 06/05/2009 Document Revised: 05/25/2017 Document Reviewed: 11/07/2016  © 2017 Elsevier

## 2020-04-18 ENCOUNTER — HOSPITAL ENCOUNTER (OUTPATIENT)
Facility: MEDICAL CENTER | Age: 45
End: 2020-04-18
Attending: NURSE PRACTITIONER
Payer: COMMERCIAL

## 2020-04-18 ENCOUNTER — OFFICE VISIT (OUTPATIENT)
Dept: URGENT CARE | Facility: PHYSICIAN GROUP | Age: 45
End: 2020-04-18
Payer: COMMERCIAL

## 2020-04-18 VITALS
SYSTOLIC BLOOD PRESSURE: 132 MMHG | HEART RATE: 86 BPM | RESPIRATION RATE: 18 BRPM | OXYGEN SATURATION: 99 % | TEMPERATURE: 97.6 F | HEIGHT: 60 IN | DIASTOLIC BLOOD PRESSURE: 82 MMHG | WEIGHT: 175 LBS | BODY MASS INDEX: 34.36 KG/M2

## 2020-04-18 DIAGNOSIS — N30.01 ACUTE CYSTITIS WITH HEMATURIA: ICD-10-CM

## 2020-04-18 LAB
APPEARANCE UR: NORMAL
BILIRUB UR STRIP-MCNC: NORMAL MG/DL
COLOR UR AUTO: YELLOW
GLUCOSE UR STRIP.AUTO-MCNC: NORMAL MG/DL
INT CON NEG: NEGATIVE
INT CON POS: POSITIVE
KETONES UR STRIP.AUTO-MCNC: NORMAL MG/DL
LEUKOCYTE ESTERASE UR QL STRIP.AUTO: NORMAL
NITRITE UR QL STRIP.AUTO: NORMAL
PH UR STRIP.AUTO: 7 [PH] (ref 5–8)
POC URINE PREGNANCY TEST: NORMAL
PROT UR QL STRIP: NORMAL MG/DL
RBC UR QL AUTO: NORMAL
SP GR UR STRIP.AUTO: 1.02
UROBILINOGEN UR STRIP-MCNC: NORMAL MG/DL

## 2020-04-18 PROCEDURE — 81002 URINALYSIS NONAUTO W/O SCOPE: CPT | Performed by: NURSE PRACTITIONER

## 2020-04-18 PROCEDURE — 87186 SC STD MICRODIL/AGAR DIL: CPT

## 2020-04-18 PROCEDURE — 87077 CULTURE AEROBIC IDENTIFY: CPT

## 2020-04-18 PROCEDURE — 81025 URINE PREGNANCY TEST: CPT | Performed by: NURSE PRACTITIONER

## 2020-04-18 PROCEDURE — 87086 URINE CULTURE/COLONY COUNT: CPT

## 2020-04-18 PROCEDURE — 99214 OFFICE O/P EST MOD 30 MIN: CPT | Performed by: NURSE PRACTITIONER

## 2020-04-18 RX ORDER — NITROFURANTOIN 25; 75 MG/1; MG/1
100 CAPSULE ORAL EVERY 12 HOURS
Qty: 10 CAP | Refills: 0 | Status: SHIPPED | OUTPATIENT
Start: 2020-04-18 | End: 2020-04-23

## 2020-04-18 NOTE — PROGRESS NOTES
Chief Complaint   Patient presents with   • Dysuria     had 2 days and then went away and started again today        HISTORY OF PRESENT ILLNESS: Patient is a 44 y.o. female who presents today due to two days of urinary burning, urgency, and frequency. Reports some associated pelvic pressure. Denies hematuria, fever, flank pain, N/V. Denies a history of pyelonephritis or kidney stones. Admits to a history of UTIs, last one was over 2 months ago, no culture obtained, improved with abx.     Patient Active Problem List    Diagnosis Date Noted   • RUQ pain 2013   • Abdominal pain 2013   • CHEST PAIN 2013   • Leucocytosis 2013   • GBS (group B streptococcus) infection 2011   • History of C-sectionx3- desires Repeat & BTL  2010   • Supervision of high-risk pregnancy of elderly multigravida 2010   • Advanced maternal age in pregnancy 2010       Allergies:Nkda [no known drug allergy]    Current Outpatient Medications Ordered in Epic   Medication Sig Dispense Refill   • nitrofurantoin (MACROBID) 100 MG Cap Take 1 Cap by mouth every 12 hours for 5 days. 10 Cap 0   • montelukast (SINGULAIR) 10 MG Tab Take 10 mg by mouth.     • triamcinolone acetonide (KENALOG) 0.1 % Cream APPLY CREAM EXTERNALLY TO AFFECTED AREA TWICE DAILY     • docusate sodium (COLACE) 100 MG CAPS Take 100 mg by mouth 2 times a day as needed. For constipation      • ibuprofen (MOTRIN) 800 MG TABS Take 800 mg by mouth every 8 hours as needed.       No current Epic-ordered facility-administered medications on file.        History reviewed. No pertinent past medical history.    Social History     Tobacco Use   • Smoking status: Never Smoker   • Smokeless tobacco: Never Used   Substance Use Topics   • Alcohol use: No   • Drug use: No       Family Status   Relation Name Status   • Mo  Alive   • Fa  Alive   • MGMo     • MGFa  Alive   • PGMo     • PGFa       Family History   Problem Relation Age  of Onset   • Hypertension Maternal Grandmother        ROS:  Review of Systems   Constitutional: Negative for fever, chills, weight loss and malaise/fatigue.   HENT: Negative for ear pain, nosebleeds, congestion, sore throat and neck pain.    Eyes: Negative for vision changes.   Neuro: Negative for headache, sensory changes, weakness, seizure, LOC.   Cardiovascular: Negative for chest pain, palpitations, orthopnea and leg swelling.   Respiratory: Negative for cough, sputum production, shortness of breath and wheezing.   Gastrointestinal: Positive for lower abdominal pressure. Negative for abdominal pain, nausea, vomiting or diarrhea.   Genitourinary: Positive for dysuria, urgency and frequency. Negative for hematuria or flank pain.   Skin: Negative for rash, diaphoresis.     Exam:  /82 (BP Location: Right arm, Patient Position: Sitting, BP Cuff Size: )   Pulse 86   Temp 36.4 °C (97.6 °F) (Tympanic)   Resp 18   Ht 1.524 m (5')   Wt 79.4 kg (175 lb)   SpO2 99%   General: well-nourished, well-developed female in NAD  Head: normocephalic, atraumatic  Eyes: PERRLA, no conjunctival injection, acuity grossly intact, lids normal.  Lymph: no cervical adenopathy. No supraclavicular adenopathy.   Neuro: alert and oriented. Cranial nerves 1-12 grossly intact. No sensory deficit.   Cardiovascular: regular rate and rhythm. No edema.  Pulmonary: no distress. Chest is symmetrical with respiration, no wheezes, crackles, or rhonchi.   Abdomen: soft, non-tender, no guarding, no hepatosplenomegaly. No CVA tenderness.   Musculoskeletal: no clubbing, appropriate muscle tone, gait is stable.  Skin: warm, dry, intact, no clubbing, no cyanosis, no rashes.   Psych: appropriate mood, affect, judgement.         Assessment/Plan:  1. Acute cystitis with hematuria  POCT Urinalysis    POCT PREGNANCY    Urine Culture    nitrofurantoin (MACROBID) 100 MG Cap       Previous clinic visit encounter reviewed and considered in medical  decision making today. POC urine positive for blood and leukocytes today.   Antibiotic as directed. Increase fluid intake. Urine sent for culture.   Supportive care, differential diagnoses, and indications for immediate follow-up discussed with patient.   Pathogenesis of diagnosis discussed including typical length and natural progression.   Instructed to return to clinic or nearest emergency department for any change in condition, further concerns, or worsening of symptoms.  Patient states understanding of the plan of care and discharge instructions.  Instructed to make an appointment, for follow up, with their primary care provider.        Please note that this dictation was created using voice recognition software. I have made every reasonable attempt to correct obvious errors, but I expect that there are errors of grammar and possibly content that I did not discover before finalizing the note.      ARMANDO Erazo.

## 2020-04-22 ENCOUNTER — TELEPHONE (OUTPATIENT)
Dept: URGENT CARE | Facility: CLINIC | Age: 45
End: 2020-04-22

## 2020-04-22 NOTE — TELEPHONE ENCOUNTER
Spoke with patient regarding her positive urine culture results. Pt stated that she is feeling better but not quite as she would like. Advised patient to continue her antibiotic and return if symptoms continue or worsens.

## 2020-07-22 ENCOUNTER — OFFICE VISIT (OUTPATIENT)
Dept: URGENT CARE | Facility: PHYSICIAN GROUP | Age: 45
End: 2020-07-22
Payer: COMMERCIAL

## 2020-07-22 ENCOUNTER — HOSPITAL ENCOUNTER (OUTPATIENT)
Facility: MEDICAL CENTER | Age: 45
End: 2020-07-22
Attending: PHYSICIAN ASSISTANT
Payer: COMMERCIAL

## 2020-07-22 VITALS
DIASTOLIC BLOOD PRESSURE: 100 MMHG | HEART RATE: 70 BPM | WEIGHT: 175 LBS | OXYGEN SATURATION: 98 % | BODY MASS INDEX: 34.36 KG/M2 | TEMPERATURE: 97.3 F | RESPIRATION RATE: 12 BRPM | HEIGHT: 60 IN | SYSTOLIC BLOOD PRESSURE: 144 MMHG

## 2020-07-22 DIAGNOSIS — N30.01 ACUTE CYSTITIS WITH HEMATURIA: ICD-10-CM

## 2020-07-22 LAB
APPEARANCE UR: ABNORMAL
BILIRUB UR STRIP-MCNC: ABNORMAL MG/DL
COLOR UR AUTO: ABNORMAL
GLUCOSE UR STRIP.AUTO-MCNC: 100 MG/DL
KETONES UR STRIP.AUTO-MCNC: 80 MG/DL
LEUKOCYTE ESTERASE UR QL STRIP.AUTO: ABNORMAL
NITRITE UR QL STRIP.AUTO: ABNORMAL
PH UR STRIP.AUTO: 9 [PH] (ref 5–8)
PROT UR QL STRIP: 300 MG/DL
RBC UR QL AUTO: ABNORMAL
SP GR UR STRIP.AUTO: 1.01
UROBILINOGEN UR STRIP-MCNC: 8 MG/DL

## 2020-07-22 PROCEDURE — 99214 OFFICE O/P EST MOD 30 MIN: CPT | Performed by: PHYSICIAN ASSISTANT

## 2020-07-22 PROCEDURE — 81002 URINALYSIS NONAUTO W/O SCOPE: CPT | Performed by: PHYSICIAN ASSISTANT

## 2020-07-22 PROCEDURE — 87086 URINE CULTURE/COLONY COUNT: CPT

## 2020-07-22 RX ORDER — NITROFURANTOIN 25; 75 MG/1; MG/1
100 CAPSULE ORAL 2 TIMES DAILY
Qty: 10 CAP | Refills: 0 | Status: SHIPPED | OUTPATIENT
Start: 2020-07-22 | End: 2020-07-27

## 2020-07-22 ASSESSMENT — ENCOUNTER SYMPTOMS
EYE DISCHARGE: 0
DIZZINESS: 0
CHILLS: 0
ABDOMINAL PAIN: 1
DIARRHEA: 0
EYE REDNESS: 0
VOMITING: 0
FEVER: 0
CONSTIPATION: 0
NAUSEA: 1
MUSCULOSKELETAL NEGATIVE: 1
HEADACHES: 0
HEMATOCHEZIA: 0
SHORTNESS OF BREATH: 0
BLOOD IN STOOL: 0

## 2020-07-22 NOTE — PROGRESS NOTES
Subjective:      Marce Johnson is a 44 y.o. female who presents with Dysmenorrhea (Pt on 2nd day of period.  Right side abdominal pain that shoots down the leg. Nausea. Onset 1 hour ago.)       Patient is accompanied by her family member, who is helping as  throughout exam.     Abdominal Pain   This is a new problem. The current episode started today. The onset quality is sudden. The problem occurs constantly. The problem has been unchanged. The pain is located in the suprapubic region and RLQ. The pain is at a severity of 5/10. The pain is moderate. The quality of the pain is sharp. Pain radiation: right thigh. Associated symptoms include nausea. Pertinent negatives include no constipation, diarrhea, dysuria, fever, frequency, headaches, hematochezia, hematuria or vomiting. Nothing aggravates the pain. The pain is relieved by being still. She has tried acetaminophen for the symptoms. The treatment provided moderate relief. Her past medical history is significant for abdominal surgery. There is no history of gallstones, irritable bowel syndrome, pancreatitis or ulcerative colitis.     Patient presents to urgent care reporting RLQ pain starting about 1 hour PTA. Pain is described as sharp and radiating down her right leg. She had some associated nausea (without vomiting) that has since resolved. She took a dose of tylenol PTA with good relief. No fevers, chills, body aches, constipation, diarrhea, dysuria, frequency, urgency, flank pain or hematuria. She is currently on her menstrual period. Surgical history is significant for  section x 3 and tubal ligation.       Review of Systems   Constitutional: Negative for chills and fever.   HENT: Negative for congestion.    Eyes: Negative for discharge and redness.   Respiratory: Negative for shortness of breath.    Cardiovascular: Negative for chest pain.   Gastrointestinal: Positive for abdominal pain and nausea. Negative for blood in stool,  constipation, diarrhea, hematochezia and vomiting.   Genitourinary: Negative.  Negative for dysuria, frequency and hematuria.   Musculoskeletal: Negative.    Skin: Negative for rash.   Neurological: Negative for dizziness and headaches.        Objective:     /100   Pulse 70   Temp 36.3 °C (97.3 °F) (Temporal)   Resp 12   Ht 1.524 m (5')   Wt 79.4 kg (175 lb)   SpO2 98%   BMI 34.18 kg/m²      Physical Exam  Vitals signs and nursing note reviewed.   Constitutional:       General: She is not in acute distress.     Appearance: Normal appearance. She is well-developed. She is not diaphoretic.   HENT:      Head: Normocephalic and atraumatic.      Right Ear: External ear normal.      Left Ear: External ear normal.      Nose: Nose normal.      Mouth/Throat:      Mouth: Mucous membranes are moist.   Eyes:      Pupils: Pupils are equal, round, and reactive to light.   Neck:      Musculoskeletal: Normal range of motion.   Cardiovascular:      Rate and Rhythm: Normal rate.   Pulmonary:      Effort: Pulmonary effort is normal.   Abdominal:      General: Abdomen is flat. Bowel sounds are normal. There is no distension.      Tenderness: There is abdominal tenderness in the right lower quadrant and suprapubic area. There is no right CVA tenderness, left CVA tenderness or guarding. Negative signs include Rovsing's sign, McBurney's sign, psoas sign and obturator sign.          Comments: + TTP in suprapubic area and RLQ   Musculoskeletal: Normal range of motion.   Skin:     General: Skin is warm and dry.   Neurological:      Mental Status: She is alert and oriented to person, place, and time.   Psychiatric:         Behavior: Behavior normal.          PMH:  has no past medical history on file.  MEDS:   Current Outpatient Medications:   •  nitrofurantoin (MACROBID) 100 MG Cap, Take 1 Cap by mouth 2 times a day for 5 days., Disp: 10 Cap, Rfl: 0  •  montelukast (SINGULAIR) 10 MG Tab, Take 10 mg by mouth., Disp: , Rfl:   •   triamcinolone acetonide (KENALOG) 0.1 % Cream, APPLY CREAM EXTERNALLY TO AFFECTED AREA TWICE DAILY, Disp: , Rfl:   •  docusate sodium (COLACE) 100 MG CAPS, Take 100 mg by mouth 2 times a day as needed. For constipation , Disp: , Rfl:   •  ibuprofen (MOTRIN) 800 MG TABS, Take 800 mg by mouth every 8 hours as needed., Disp: , Rfl:   ALLERGIES:   Allergies   Allergen Reactions   • Nkda [No Known Drug Allergy]      SURGHX:   Past Surgical History:   Procedure Laterality Date   • REPEAT C SECTION W TUBAL LIGATION  2/6/2011    Performed by KEIKO RESENDEZ at [facility]   • BREAST BIOPSY     • PRIMARY C SECTION  x 3      SOCHX:  reports that she has never smoked. She has never used smokeless tobacco. She reports that she does not drink alcohol or use drugs.  FH: family history includes Hypertension in her maternal grandmother.     POCT Urinalysis:  Ref Range & Units  12:20 PM    POC Color  Negative  Red     POC Appearance  Negative  Cloudy     POC Leukocyte Esterase  Negative  LG     POC Nitrites  Negative  Pos     POC Urobiligen  Negative (0.2) mg/dL  8.0     POC Protein  Negative mg/dL  300     POC Urine PH  5.0 - 8.0  9.0Abnormal       POC Blood  Negative  LG     POC Specific Gravity  <1.005 - >1.030  1.010     POC Ketones  Negative mg/dL  80     POC Bilirubin  Negative mg/dL  LG     POC Glucose  Negative mg/dL  100        Assessment/Plan:       1. Acute cystitis with hematuria    - POCT Urinalysis --> + leuks, + blood, + nitrites   - URINE CULTURE(NEW); Future  - nitrofurantoin (MACROBID) 100 MG Cap; Take 1 Cap by mouth 2 times a day for 5 days.  Dispense: 10 Cap; Refill: 0   - Complete full course of antibiotics as prescribed     - Pt educated on preventative measures for avoiding future UTIs  - Advised to increase fluid intake  - OTC Pyridium (Azo) for symptomatic relief, advised that it will turn urine orange in color  - Pending urine culture  - ER precautions given regarding pyelonephritis including fevers  greater than 101 and, vomiting and dehydration, increased back pain.      Discussed with patient she does have UTI on urinalysis at today's visit. She does have TTP in suprapubic area and RLQ, although pain is more localized to pelvic region. Discussed concern for possible early/developing appendicitis, although pain just started 1 hour ago. Will treat for UTI at today's visit, with STRICT precautions for reevaluation if pain in RLQ is persistent/worsening or she develops fevers, chills, nausea, vomiting, diarrhea, etc. The patient and her family member demonstrated a good understanding and agreed with the treatment plan.

## 2020-07-25 LAB
BACTERIA UR CULT: NORMAL
SIGNIFICANT IND 70042: NORMAL
SITE SITE: NORMAL
SOURCE SOURCE: NORMAL

## 2020-07-27 ENCOUNTER — TELEPHONE (OUTPATIENT)
Dept: URGENT CARE | Facility: PHYSICIAN GROUP | Age: 45
End: 2020-07-27

## 2020-07-27 NOTE — TELEPHONE ENCOUNTER
Spoke to patient's  ([email or web address]) and informed him of negative urine culture results. He states she is feeling better and appreciates the phone call.

## 2020-09-03 ENCOUNTER — OFFICE VISIT (OUTPATIENT)
Dept: URGENT CARE | Facility: PHYSICIAN GROUP | Age: 45
End: 2020-09-03
Payer: COMMERCIAL

## 2020-09-03 VITALS
RESPIRATION RATE: 16 BRPM | BODY MASS INDEX: 34.4 KG/M2 | DIASTOLIC BLOOD PRESSURE: 78 MMHG | HEART RATE: 78 BPM | SYSTOLIC BLOOD PRESSURE: 122 MMHG | TEMPERATURE: 97.4 F | OXYGEN SATURATION: 100 % | WEIGHT: 175.2 LBS | HEIGHT: 60 IN

## 2020-09-03 DIAGNOSIS — T78.40XA ALLERGIC STATE, INITIAL ENCOUNTER: ICD-10-CM

## 2020-09-03 PROCEDURE — 99214 OFFICE O/P EST MOD 30 MIN: CPT | Performed by: FAMILY MEDICINE

## 2020-09-03 RX ORDER — TRIAMCINOLONE ACETONIDE 0.25 MG/G
1 CREAM TOPICAL 2 TIMES DAILY
Qty: 40 G | Refills: 0 | Status: SHIPPED | OUTPATIENT
Start: 2020-09-03

## 2020-09-03 RX ORDER — KETOTIFEN FUMARATE 0.25 MG/ML
1 SOLUTION/ DROPS OPHTHALMIC 2 TIMES DAILY
Qty: 10 ML | Refills: 0 | Status: SHIPPED | OUTPATIENT
Start: 2020-09-03

## 2020-09-03 RX ORDER — HYDROXYZINE HYDROCHLORIDE 25 MG/1
25-50 TABLET, FILM COATED ORAL EVERY 8 HOURS PRN
Qty: 30 TAB | Refills: 0 | Status: SHIPPED | OUTPATIENT
Start: 2020-09-03

## 2020-09-03 NOTE — PROGRESS NOTES
Chief Complaint   Patient presents with   • Allergic Reaction     having a posible allergic reaction, very itchy face, some itching in fingers, x3 days          HPI:      Pt c/o itchy face x 2 d .    There is no rash.    She denies pain.  Itching not improved with topical hydrocortisone.    Denies any new body products - lotions, soaps, detergents, etc.   No change in diet.   No recent travel.       Past medical history was unremarkable and not pertinent to current issue  Social hx - denies tobacco, alcohol, drug use  Family hx was reviewed - no pertinent past family hx          Review of Systems   Constitutional: Negative for fever, chills and malaise/fatigue.   Eyes: Negative for vision changes, d/c.    Respiratory: Negative for cough and sputum production.    Cardiovascular: Negative for chest pain and palpitations.   Gastrointestinal: Negative for nausea, vomiting, abdominal pain, diarrhea and constipation.   Genitourinary: Negative for dysuria, urgency and frequency.   Skin: Negative for rash .   + itching.   Neurological: Negative for dizziness and tingling.   Psychiatric/Behavioral: Negative for depression.   Hematologic/lymphatic - denies bruising or excessive bleeding  All other systems reviewed and are negative.        OBJECTIVE  /78 (BP Location: Right arm, Patient Position: Sitting, BP Cuff Size: Adult)   Pulse 78   Temp 36.3 °C (97.4 °F) (Temporal)   Resp 16   Ht 1.524 m (5')   Wt 79.5 kg (175 lb 3.2 oz)   SpO2 100%   HEENT - PERRLA, EOMI  Neuro - alert and oriented x3. CN 2-12 grossly intact.  Lungs - CTA. No wheezes, rhonchi or rales.  Heart - regular rate and rhythm without murmur.  Abdomen - soft and non-tender, bowel sounds active x4.  Skin:   There is no facial rash or other skin changes.   No erythema.         1. Allergic state, initial encounter      - hydrOXYzine HCl (ATARAX) 25 MG Tab; Take 1-2 Tabs by mouth every 8 hours as needed for Itching.  Dispense: 30 Tab; Refill: 0  -  ketotifen (ZADITOR) 0.025 % ophthalmic solution; Place 1 Drop in both eyes 2 times a day.  Dispense: 10 mL; Refill: 0  - triamcinolone acetonide (KENALOG) 0.025 % Cream; Apply 1 Application to affected area(s) 2 times a day.  Dispense: 40 g; Refill: 0        Follow up in one week if no improvement, sooner if symptoms worsen.

## 2020-09-07 ENCOUNTER — OFFICE VISIT (OUTPATIENT)
Dept: URGENT CARE | Facility: PHYSICIAN GROUP | Age: 45
End: 2020-09-07
Payer: COMMERCIAL

## 2020-09-07 VITALS
TEMPERATURE: 98.4 F | RESPIRATION RATE: 16 BRPM | DIASTOLIC BLOOD PRESSURE: 80 MMHG | HEART RATE: 84 BPM | SYSTOLIC BLOOD PRESSURE: 126 MMHG | OXYGEN SATURATION: 98 % | WEIGHT: 175 LBS | BODY MASS INDEX: 34.36 KG/M2 | HEIGHT: 60 IN

## 2020-09-07 DIAGNOSIS — L30.9 DERMATITIS: ICD-10-CM

## 2020-09-07 PROCEDURE — 99214 OFFICE O/P EST MOD 30 MIN: CPT | Performed by: PHYSICIAN ASSISTANT

## 2020-09-07 RX ORDER — METHYLPREDNISOLONE 4 MG/1
TABLET ORAL
Qty: 21 TAB | Refills: 0 | Status: SHIPPED | OUTPATIENT
Start: 2020-09-07

## 2020-09-07 RX ORDER — TRIAMCINOLONE ACETONIDE 1 MG/G
OINTMENT TOPICAL
Qty: 30 G | Refills: 0 | Status: SHIPPED | OUTPATIENT
Start: 2020-09-07

## 2020-09-07 ASSESSMENT — ENCOUNTER SYMPTOMS
ABDOMINAL PAIN: 0
WHEEZING: 0
FEVER: 0
COUGH: 0
VOMITING: 0
CHILLS: 0
HEADACHES: 0
DIARRHEA: 0
NAUSEA: 0
EYES NEGATIVE: 1
SHORTNESS OF BREATH: 0
MYALGIAS: 0

## 2020-09-07 NOTE — PATIENT INSTRUCTIONS
Atopic Dermatitis  Atopic dermatitis is a skin disorder that causes inflammation of the skin. This is the most common type of eczema. Eczema is a group of skin conditions that cause the skin to be itchy, red, and swollen. This condition is generally worse during the cooler winter months and often improves during the warm summer months. Symptoms can vary from person to person.  Atopic dermatitis usually starts showing signs in infancy and can last through adulthood. This condition cannot be passed from one person to another (non-contagious), but it is more common in families. Atopic dermatitis may not always be present. When it is present, it is called a flare-up.  What are the causes?  The exact cause of this condition is not known. Flare-ups of the condition may be triggered by:  · Contact with something that you are sensitive or allergic to.  · Stress.  · Certain foods.  · Extremely hot or cold weather.  · Harsh chemicals and soaps.  · Dry air.  · Chlorine.  What increases the risk?  This condition is more likely to develop in people who have a personal history or family history of eczema, allergies, asthma, or hay fever.  What are the signs or symptoms?  Symptoms of this condition include:  · Dry, scaly skin.  · Red, itchy rash.  · Itchiness, which can be severe. This may occur before the skin rash. This can make sleeping difficult.  · Skin thickening and cracking that can occur over time.  How is this diagnosed?  This condition is diagnosed based on your symptoms, a medical history, and a physical exam.  How is this treated?  There is no cure for this condition, but symptoms can usually be controlled. Treatment focuses on:  · Controlling the itchiness and scratching. You may be given medicines, such as antihistamines or steroid creams.  · Limiting exposure to things that you are sensitive or allergic to (allergens).  · Recognizing situations that cause stress and developing a plan to manage stress.  If your  atopic dermatitis does not get better with medicines, or if it is all over your body (widespread), a treatment using a specific type of light (phototherapy) may be used.  Follow these instructions at home:  Skin care    · Keep your skin well-moisturized. Doing this seals in moisture and helps to prevent dryness.  ? Use unscented lotions that have petroleum in them.  ? Avoid lotions that contain alcohol or water. They can dry the skin.  · Keep baths or showers short (less than 5 minutes) in warm water. Do not use hot water.  ? Use mild, unscented cleansers for bathing. Avoid soap and bubble bath.  ? Apply a moisturizer to your skin right after a bath or shower.  · Do not apply anything to your skin without checking with your health care provider.  General instructions  · Dress in clothes made of cotton or cotton blends. Dress lightly because heat increases itchiness.  · When washing your clothes, rinse your clothes twice so all of the soap is removed.  · Avoid any triggers that can cause a flare-up.  · Try to manage your stress.  · Keep your fingernails cut short.  · Avoid scratching. Scratching makes the rash and itchiness worse. It may also result in a skin infection (impetigo) due to a break in the skin caused by scratching.  · Take or apply over-the-counter and prescription medicines only as told by your health care provider.  · Keep all follow-up visits as told by your health care provider. This is important.  · Do not be around people who have cold sores or fever blisters. If you get the infection, it may cause your atopic dermatitis to worsen.  Contact a health care provider if:  · Your itchiness interferes with sleep.  · Your rash gets worse or it is not better within one week of starting treatment.  · You have a fever.  · You have a rash flare-up after having contact with someone who has cold sores or fever blisters.  Get help right away if:  · You develop pus or soft yellow scabs in the rash  area.  Summary  · This condition causes a red rash and itchy, dry, scaly skin.  · Treatment focuses on controlling the itchiness and scratching, limiting exposure to things that you are sensitive or allergic to (allergens), recognizing situations that cause stress, and developing a plan to manage stress.  · Keep your skin well-moisturized.  · Keep baths or showers shorter than 5 minutes and use warm water. Do not use hot water.  This information is not intended to replace advice given to you by your health care provider. Make sure you discuss any questions you have with your health care provider.  Document Released: 12/15/2001 Document Revised: 04/07/2020 Document Reviewed: 01/19/2018  Elsevier Patient Education © 2020 [employer]

## 2020-09-07 NOTE — PROGRESS NOTES
Subjective:   Marce Johnson is a 44 y.o. female who presents for Rash (itchiness since wednesday, meds not working )      HPI  Patient is a 44-year-old female who presents with 6 days of itchiness mostly to her chest, upper neck, bilateral arms, and hands.  She was seen on 09/03/2020 and prescribed hydroxyzine, and triamcinolone 0.025% cream.  She states the cream helps a little bit but very temporarily.  She states the hydroxyzine makes her nauseous and drowsy.  She has not needed in the ophthalmic eyedrops that she obtained over-the-counter.  She developed a red rash over the sites of itchiness about 4 days ago.  She has a history of similar rash in the past that has reoccurred.  Otherwise, she denies any fevers, chills, sore throat, cough, chest pain, shortness of breath, wheezing, vomiting.  No new soaps or detergents or other new skin contacts.       Review of Systems   Constitutional: Negative for chills, fever and malaise/fatigue.   HENT: Negative.    Eyes: Negative.    Respiratory: Negative for cough, shortness of breath and wheezing.    Cardiovascular: Negative for chest pain.   Gastrointestinal: Negative for abdominal pain, diarrhea, nausea and vomiting.   Musculoskeletal: Negative for joint pain and myalgias.   Skin: Positive for itching and rash.   Neurological: Negative for headaches.       Medications:    • docusate sodium Caps  • hydrOXYzine HCl Tabs  • ibuprofen Tabs  • ketotifen  • montelukast Tabs  • triamcinolone acetonide Crea    Allergies: Nkda [no known drug allergy]    Problem List: Marce Johnson has History of C-sectionx3- desires Repeat & BTL ; Supervision of high-risk pregnancy of elderly multigravida; Advanced maternal age in pregnancy; GBS (group B streptococcus) infection; RUQ pain; Abdominal pain; CHEST PAIN; and Leucocytosis on their problem list.    Surgical History:  Past Surgical History:   Procedure Laterality Date   • REPEAT C SECTION W TUBAL LIGATION  2/6/2011     Performed by KEIKO RESENDEZ at [facility]   • BREAST BIOPSY     • PRIMARY C SECTION  x 3        Past Social Hx: Marce Johnson  reports that she has never smoked. She has never used smokeless tobacco. She reports that she does not drink alcohol or use drugs.     Past Family Hx:  Marce Johnson family history includes Hypertension in her maternal grandmother.     Problem list, medications, and allergies reviewed by myself today in Epic.     Objective:     /80 (BP Location: Right arm, Patient Position: Sitting, BP Cuff Size: Adult)   Pulse 84   Temp 36.9 °C (98.4 °F) (Tympanic)   Resp 16   Ht 1.524 m (5')   Wt 79.4 kg (175 lb)   SpO2 98%   Breastfeeding No   BMI 34.18 kg/m²     Physical Exam  Vitals signs reviewed.   Constitutional:       General: She is not in acute distress.     Appearance: Normal appearance. She is not ill-appearing or toxic-appearing.   HENT:      Head: Normocephalic.      Right Ear: Tympanic membrane, ear canal and external ear normal.      Left Ear: Tympanic membrane, ear canal and external ear normal.      Nose: Nose normal.      Mouth/Throat:      Mouth: Mucous membranes are moist.      Pharynx: Oropharynx is clear. No oropharyngeal exudate or posterior oropharyngeal erythema.   Eyes:      Conjunctiva/sclera: Conjunctivae normal.      Pupils: Pupils are equal, round, and reactive to light.   Neck:      Musculoskeletal: Neck supple.   Cardiovascular:      Rate and Rhythm: Normal rate.      Heart sounds: Normal heart sounds.   Pulmonary:      Effort: Pulmonary effort is normal. No respiratory distress.      Breath sounds: Normal breath sounds. No wheezing, rhonchi or rales.   Lymphadenopathy:      Cervical: No cervical adenopathy.   Skin:     General: Skin is warm and dry.      Findings: Rash present.             Comments: Erythematous papular scattered rash to upper neck, upper chest, bilateral arms and fingers.  There is some small clear fluid vesicular lesions to  her fingers and arms.  There is some mild excoriation to sites.   Neurological:      General: No focal deficit present.      Mental Status: She is alert and oriented to person, place, and time.   Psychiatric:         Mood and Affect: Mood normal.         Behavior: Behavior normal.         Assessment/Plan:     Diagnosis and associated orders:     1. Dermatitis  methylPREDNISolone (MEDROL DOSEPAK) 4 MG Tablet Therapy Pack    triamcinolone acetonide (KENALOG) 0.1 % Ointment      Comments/MDM:     • Discussed with patient patient's daughter signs symptoms are consistent with a dermatitis such as eczema or dyshidrotic eczema.   • Start higher dose of triamcinolone ointment 2 times a day for no longer than 2 to 4 weeks to rashes.  Avoid ointment to face.  She may apply the lower dose triamcinolone cream to the edge of her jaw and back of her ear area twice per day a very thin layer from a longer than 2 weeks.   • Cetaphil emollient hydration.   • Start Medrol Dosepak.   • Discontinue hydroxyzine since this makes you too drowsy and nauseous.  She may try Zyrtec or Claritin in the morning.   • Return to the urgent care if symptoms are not improving within 1 week or follow-up with PCP for further evaluation and treatment.  Return earlier if any worsening symptoms.       Red flags discussed   Supportive care, differential diagnoses, and indications for immediate follow-up discussed with patient.    Pathogenesis of diagnosis discussed including typical length and natural progression. Patient expresses understanding and agrees to plan.    Advised the patient to follow-up with the primary care physician for recheck, reevaluation, and consideration of further management.    Please note that this dictation was created using voice recognition software. I have made a reasonable attempt to correct obvious errors, but I expect that there are errors of grammar and possibly content that I did not discover before finalizing the  note.    This note was electronically signed by Zachary Barajas PA-C

## 2023-08-23 ENCOUNTER — APPOINTMENT (OUTPATIENT)
Dept: RADIOLOGY | Facility: MEDICAL CENTER | Age: 48
End: 2023-08-23
Attending: EMERGENCY MEDICINE

## 2023-08-23 ENCOUNTER — HOSPITAL ENCOUNTER (EMERGENCY)
Facility: MEDICAL CENTER | Age: 48
End: 2023-08-23
Attending: EMERGENCY MEDICINE

## 2023-08-23 VITALS
OXYGEN SATURATION: 98 % | HEART RATE: 76 BPM | TEMPERATURE: 97.5 F | RESPIRATION RATE: 16 BRPM | HEIGHT: 60 IN | WEIGHT: 177.47 LBS | SYSTOLIC BLOOD PRESSURE: 148 MMHG | BODY MASS INDEX: 34.84 KG/M2 | DIASTOLIC BLOOD PRESSURE: 86 MMHG

## 2023-08-23 DIAGNOSIS — R11.2 NAUSEA AND VOMITING, UNSPECIFIED VOMITING TYPE: ICD-10-CM

## 2023-08-23 DIAGNOSIS — R10.84 GENERALIZED ABDOMINAL PAIN: ICD-10-CM

## 2023-08-23 LAB
ALBUMIN SERPL BCP-MCNC: 4.4 G/DL (ref 3.2–4.9)
ALBUMIN/GLOB SERPL: 1.3 G/DL
ALP SERPL-CCNC: 73 U/L (ref 30–99)
ALT SERPL-CCNC: 21 U/L (ref 2–50)
ANION GAP SERPL CALC-SCNC: 11 MMOL/L (ref 7–16)
APPEARANCE UR: CLEAR
AST SERPL-CCNC: 26 U/L (ref 12–45)
BASOPHILS # BLD AUTO: 0.4 % (ref 0–1.8)
BASOPHILS # BLD: 0.03 K/UL (ref 0–0.12)
BILIRUB SERPL-MCNC: 0.9 MG/DL (ref 0.1–1.5)
BILIRUB UR QL STRIP.AUTO: NEGATIVE
BUN SERPL-MCNC: 7 MG/DL (ref 8–22)
CALCIUM ALBUM COR SERPL-MCNC: 8.8 MG/DL (ref 8.5–10.5)
CALCIUM SERPL-MCNC: 9.1 MG/DL (ref 8.5–10.5)
CHLORIDE SERPL-SCNC: 104 MMOL/L (ref 96–112)
CO2 SERPL-SCNC: 22 MMOL/L (ref 20–33)
COLOR UR: YELLOW
CREAT SERPL-MCNC: 0.67 MG/DL (ref 0.5–1.4)
EKG IMPRESSION: NORMAL
EOSINOPHIL # BLD AUTO: 0.13 K/UL (ref 0–0.51)
EOSINOPHIL NFR BLD: 1.7 % (ref 0–6.9)
ERYTHROCYTE [DISTWIDTH] IN BLOOD BY AUTOMATED COUNT: 43.4 FL (ref 35.9–50)
GFR SERPLBLD CREATININE-BSD FMLA CKD-EPI: 107 ML/MIN/1.73 M 2
GLOBULIN SER CALC-MCNC: 3.4 G/DL (ref 1.9–3.5)
GLUCOSE SERPL-MCNC: 110 MG/DL (ref 65–99)
GLUCOSE UR STRIP.AUTO-MCNC: NEGATIVE MG/DL
HCG SERPL QL: NEGATIVE
HCT VFR BLD AUTO: 38 % (ref 37–47)
HGB BLD-MCNC: 12.5 G/DL (ref 12–16)
IMM GRANULOCYTES # BLD AUTO: 0.03 K/UL (ref 0–0.11)
IMM GRANULOCYTES NFR BLD AUTO: 0.4 % (ref 0–0.9)
KETONES UR STRIP.AUTO-MCNC: NEGATIVE MG/DL
LEUKOCYTE ESTERASE UR QL STRIP.AUTO: NEGATIVE
LIPASE SERPL-CCNC: 37 U/L (ref 11–82)
LYMPHOCYTES # BLD AUTO: 1.32 K/UL (ref 1–4.8)
LYMPHOCYTES NFR BLD: 17.5 % (ref 22–41)
MCH RBC QN AUTO: 25.7 PG (ref 27–33)
MCHC RBC AUTO-ENTMCNC: 32.9 G/DL (ref 32.2–35.5)
MCV RBC AUTO: 78 FL (ref 81.4–97.8)
MICRO URNS: NORMAL
MONOCYTES # BLD AUTO: 0.2 K/UL (ref 0–0.85)
MONOCYTES NFR BLD AUTO: 2.6 % (ref 0–13.4)
NEUTROPHILS # BLD AUTO: 5.85 K/UL (ref 1.82–7.42)
NEUTROPHILS NFR BLD: 77.4 % (ref 44–72)
NITRITE UR QL STRIP.AUTO: NEGATIVE
NRBC # BLD AUTO: 0 K/UL
NRBC BLD-RTO: 0 /100 WBC (ref 0–0.2)
PH UR STRIP.AUTO: 8 [PH] (ref 5–8)
PLATELET # BLD AUTO: 315 K/UL (ref 164–446)
PMV BLD AUTO: 10.3 FL (ref 9–12.9)
POTASSIUM SERPL-SCNC: 4.1 MMOL/L (ref 3.6–5.5)
PROT SERPL-MCNC: 7.8 G/DL (ref 6–8.2)
PROT UR QL STRIP: NEGATIVE MG/DL
RBC # BLD AUTO: 4.87 M/UL (ref 4.2–5.4)
RBC UR QL AUTO: NEGATIVE
SODIUM SERPL-SCNC: 137 MMOL/L (ref 135–145)
SP GR UR STRIP.AUTO: 1.01
TROPONIN T SERPL-MCNC: 9 NG/L (ref 6–19)
UROBILINOGEN UR STRIP.AUTO-MCNC: 0.2 MG/DL
WBC # BLD AUTO: 7.6 K/UL (ref 4.8–10.8)

## 2023-08-23 PROCEDURE — 700102 HCHG RX REV CODE 250 W/ 637 OVERRIDE(OP): Performed by: EMERGENCY MEDICINE

## 2023-08-23 PROCEDURE — 84703 CHORIONIC GONADOTROPIN ASSAY: CPT

## 2023-08-23 PROCEDURE — 96374 THER/PROPH/DIAG INJ IV PUSH: CPT

## 2023-08-23 PROCEDURE — 93005 ELECTROCARDIOGRAM TRACING: CPT | Performed by: EMERGENCY MEDICINE

## 2023-08-23 PROCEDURE — 99285 EMERGENCY DEPT VISIT HI MDM: CPT

## 2023-08-23 PROCEDURE — 81003 URINALYSIS AUTO W/O SCOPE: CPT

## 2023-08-23 PROCEDURE — 700111 HCHG RX REV CODE 636 W/ 250 OVERRIDE (IP): Mod: JZ | Performed by: EMERGENCY MEDICINE

## 2023-08-23 PROCEDURE — 83690 ASSAY OF LIPASE: CPT

## 2023-08-23 PROCEDURE — 84484 ASSAY OF TROPONIN QUANT: CPT

## 2023-08-23 PROCEDURE — 80053 COMPREHEN METABOLIC PANEL: CPT

## 2023-08-23 PROCEDURE — 85025 COMPLETE CBC W/AUTO DIFF WBC: CPT

## 2023-08-23 PROCEDURE — 74177 CT ABD & PELVIS W/CONTRAST: CPT

## 2023-08-23 PROCEDURE — 36415 COLL VENOUS BLD VENIPUNCTURE: CPT

## 2023-08-23 PROCEDURE — A9270 NON-COVERED ITEM OR SERVICE: HCPCS | Performed by: EMERGENCY MEDICINE

## 2023-08-23 PROCEDURE — 93005 ELECTROCARDIOGRAM TRACING: CPT

## 2023-08-23 PROCEDURE — 700117 HCHG RX CONTRAST REV CODE 255: Performed by: EMERGENCY MEDICINE

## 2023-08-23 RX ORDER — LABETALOL HYDROCHLORIDE 5 MG/ML
10 INJECTION, SOLUTION INTRAVENOUS ONCE
Status: DISCONTINUED | OUTPATIENT
Start: 2023-08-23 | End: 2023-08-23 | Stop reason: HOSPADM

## 2023-08-23 RX ORDER — ONDANSETRON 2 MG/ML
4 INJECTION INTRAMUSCULAR; INTRAVENOUS ONCE
Status: COMPLETED | OUTPATIENT
Start: 2023-08-23 | End: 2023-08-23

## 2023-08-23 RX ORDER — ACETAMINOPHEN 325 MG/1
975 TABLET ORAL ONCE
Status: COMPLETED | OUTPATIENT
Start: 2023-08-23 | End: 2023-08-23

## 2023-08-23 RX ADMIN — ACETAMINOPHEN 975 MG: 325 TABLET, FILM COATED ORAL at 13:05

## 2023-08-23 RX ADMIN — IOHEXOL 100 ML: 350 INJECTION, SOLUTION INTRAVENOUS at 12:23

## 2023-08-23 RX ADMIN — ONDANSETRON 4 MG: 2 INJECTION INTRAMUSCULAR; INTRAVENOUS at 11:13

## 2023-08-23 NOTE — ED PROVIDER NOTES
ED Provider Note    CHIEF COMPLAINT  Chief Complaint   Patient presents with    Hypertension    Vomiting    Abdominal Pain       EXTERNAL RECORDS REVIEWED  Reviewed previous imaging studies laboratory studies    HPI/ROS  LIMITATION TO HISTORY   Patient is Bengali-speaking only  OUTSIDE HISTORIAN(S):  Family provided translation services as well as additional information    Marce Sainz is a 47 y.o. female who presents evaluation of a constellation of symptoms including including epigastric discomfort central abdominal discomfort nausea and vomiting without diarrhea.  The patient has not been feeling well for around 2 days.  She checked her blood pressure at home and it was high normal at around 150/80.  She denies any hematemesis hematochezia or melena.  No pain in her back or urinary symptoms such as dysuria or hematuria.  No chest pain.  No strokelike symptoms such as focal numbness weakness tingling to the arms legs or face    PAST MEDICAL HISTORY   Hypertension    SURGICAL HISTORY   has a past surgical history that includes primary c section (x 3 ); repeat c section w tubal ligation (2/6/2011); and breast biopsy.    FAMILY HISTORY  Family History   Problem Relation Age of Onset    Hypertension Maternal Grandmother        SOCIAL HISTORY  Social History     Tobacco Use    Smoking status: Never    Smokeless tobacco: Never   Vaping Use    Vaping Use: Never used   Substance and Sexual Activity    Alcohol use: No    Drug use: No    Sexual activity: Yes     Partners: Male     No alcohol abuse  CURRENT MEDICATIONS  Home Medications    **Home medications have not yet been reviewed for this encounter**     No regular meds    ALLERGIES  Allergies   Allergen Reactions    Nkda [No Known Drug Allergy]        PHYSICAL EXAM  VITAL SIGNS: BP (!) 170/107   Pulse 95   Temp 35.9 °C (96.6 °F) (Temporal)   Resp 16   Ht 1.524 m (5')   Wt 80.5 kg (177 lb 7.5 oz)   LMP 08/08/2023 (Exact Date)   SpO2 98%   BMI  34.66 kg/m²    Pulse ox interpretation: I interpret this pulse ox as normal.  Constitutional: Alert and oriented x 3, mild distress  HEENT: Atraumatic normocephalic, pupils are equal round reactive to light extraocular movements are intact. The nares is clear, external ears are normal, mouth shows dry mucous membranes normal dentition for age  Neck: Supple, no JVD no tracheal deviation  Cardiovascular: Regular rate and rhythm no murmur rub or gallop 2+ pulses peripherally x4  Thorax & Lungs: No respiratory distress, no wheezes rales or rhonchi, No chest tenderness.   GI: Diffuse mild epigastric discomfort without palpable hernia or mass no rebound guarding or rigidity.  Back no flank pain no midline thoracolumbar bony tenderness  Skin: Warm dry no acute rash or lesion  Musculoskeletal: Moving all extremities with full range and 5 of 5 strength no acute  deformity  Neurologic: Cranial nerves III through XII are grossly intact no sensory deficit no cerebellar dysfunction   Psychiatric: Anxious          DIAGNOSTIC STUDIES / PROCEDURES    LABS  Results for orders placed or performed during the hospital encounter of 08/23/23   CBC WITH DIFFERENTIAL   Result Value Ref Range    WBC 7.6 4.8 - 10.8 K/uL    RBC 4.87 4.20 - 5.40 M/uL    Hemoglobin 12.5 12.0 - 16.0 g/dL    Hematocrit 38.0 37.0 - 47.0 %    MCV 78.0 (L) 81.4 - 97.8 fL    MCH 25.7 (L) 27.0 - 33.0 pg    MCHC 32.9 32.2 - 35.5 g/dL    RDW 43.4 35.9 - 50.0 fL    Platelet Count 315 164 - 446 K/uL    MPV 10.3 9.0 - 12.9 fL    Neutrophils-Polys 77.40 (H) 44.00 - 72.00 %    Lymphocytes 17.50 (L) 22.00 - 41.00 %    Monocytes 2.60 0.00 - 13.40 %    Eosinophils 1.70 0.00 - 6.90 %    Basophils 0.40 0.00 - 1.80 %    Immature Granulocytes 0.40 0.00 - 0.90 %    Nucleated RBC 0.00 0.00 - 0.20 /100 WBC    Neutrophils (Absolute) 5.85 1.82 - 7.42 K/uL    Lymphs (Absolute) 1.32 1.00 - 4.80 K/uL    Monos (Absolute) 0.20 0.00 - 0.85 K/uL    Eos (Absolute) 0.13 0.00 - 0.51 K/uL    Baso  (Absolute) 0.03 0.00 - 0.12 K/uL    Immature Granulocytes (abs) 0.03 0.00 - 0.11 K/uL    NRBC (Absolute) 0.00 K/uL   COMP METABOLIC PANEL   Result Value Ref Range    Sodium 137 135 - 145 mmol/L    Potassium 4.1 3.6 - 5.5 mmol/L    Chloride 104 96 - 112 mmol/L    Co2 22 20 - 33 mmol/L    Anion Gap 11.0 7.0 - 16.0    Glucose 110 (H) 65 - 99 mg/dL    Bun 7 (L) 8 - 22 mg/dL    Creatinine 0.67 0.50 - 1.40 mg/dL    Calcium 9.1 8.5 - 10.5 mg/dL    Correct Calcium 8.8 8.5 - 10.5 mg/dL    AST(SGOT) 26 12 - 45 U/L    ALT(SGPT) 21 2 - 50 U/L    Alkaline Phosphatase 73 30 - 99 U/L    Total Bilirubin 0.9 0.1 - 1.5 mg/dL    Albumin 4.4 3.2 - 4.9 g/dL    Total Protein 7.8 6.0 - 8.2 g/dL    Globulin 3.4 1.9 - 3.5 g/dL    A-G Ratio 1.3 g/dL   LIPASE   Result Value Ref Range    Lipase 37 11 - 82 U/L   HCG QUAL SERUM   Result Value Ref Range    Beta-Hcg Qualitative Serum Negative Negative   URINALYSIS,CULTURE IF INDICATED    Specimen: Urine   Result Value Ref Range    Color Yellow     Character Clear     Specific Gravity 1.008 <1.035    Ph 8.0 5.0 - 8.0    Glucose Negative Negative mg/dL    Ketones Negative Negative mg/dL    Protein Negative Negative mg/dL    Bilirubin Negative Negative    Urobilinogen, Urine 0.2 Negative    Nitrite Negative Negative    Leukocyte Esterase Negative Negative    Occult Blood Negative Negative    Micro Urine Req see below    TROPONIN   Result Value Ref Range    Troponin T 9 6 - 19 ng/L   ESTIMATED GFR   Result Value Ref Range    GFR (CKD-EPI) 107 >60 mL/min/1.73 m 2   EKG   Result Value Ref Range    Report       [facility] Emergency Dept.    Test Date:  2023  Pt Name:    PAULO GARG        Department: ER  MRN:        [id number]                      Room:  Gender:     Female                       Technician: [id number]  :        [id number]                   Requested By:ER TRIAGE PROTOCOL  Order #:    [id number]                    Warren MD:    Measurements  Intervals                                 Axis  Rate:       85                           P:          45  RI:         156                          QRS:        40  QRSD:       79                           T:          2  QT:         358  QTc:        426    Interpretive Statements  Sinus rhythm  Compared to ECG 11/18/2017 00:01:47  T-wave abnormality no longer present        12Lead EKG interpretation by me rate 85 sinus rhythm no acute ST segment elevation or depression no pathological T wave inversion.  No suggestion of high-grade heart block arrhythmia or ischemia  RADIOLOGY  I have independently interpreted the diagnostic imaging associated with this visit and am waiting the final reading from the radiologist.   My preliminary interpretation is as follows: Acute intra-abdominal process such as free air or bowel obstruction  Radiologist interpretation:   CT-ABDOMEN-PELVIS WITH   Final Result      1.  No evidence of acute inflammatory process in the abdomen or pelvis   2.  7 mm LEFT adrenal nodule which is nonspecific on this exam. The strong majority of incidentally detected adrenal nodules are benign.          COURSE & MEDICAL DECISION MAKING    ED Observation Status? Yes; I am placing the patient in to an observation status due to a diagnostic uncertainty as well as therapeutic intensity. Patient placed in observation status at 11 AM, 8/23/2023.     Observation plan is as follows: Tablets an IV administer parenteral nausea and pain medication, monitor vital signs perform serial abdominal exams    Upon Reevaluation, the patient's condition has: Improved; and will be discharged.    Patient discharged from ED Observation status at 1350 (Time) 8/23 (Date).     INITIAL ASSESSMENT, COURSE AND PLAN  Care Narrative:       This is a very pleasant 39-year-old female presents here with a constellation of symptoms including nausea and vomiting central abdominal discomfort some transient epigastric discomfort but no chest pain.  She had an  extensive evaluation today.  I was worried about possible acute pancreatitis cholecystitis enteritis atypical ACS abdominal aortic aneurysm urinary tract infection kidney stone appendicitis colitis etc.  The patient had a reassuring set of vital signs other than mild high blood pressure.  Specifically she did not have high fever tachycardia hypotension or hypoxia.  CBC did not demonstrate any anemia leukocytosis or bandemia.  Metabolic panel including LFTs and lipase was unremarkable.  Subsequent CT scan did not demonstrate any acute inflammatory obstructive process.  EKG did not suggest any ischemia and troponin is negative and she has had what I would consider atypical symptoms for over 12 hours and ACS is very low consideration.  After treatment she feels much better.  I suspect this could be a self-limiting viral process.  Blood pressure came down without intervention.  I recommend she return as needed for new or worsening 10 symptoms and to follow-up with PCP in 1 to 2 days as needed  HYDRATION: Based on the patient's presentation of Acute Vomiting the patient was given IV fluids. IV Hydration was used because oral hydration was not adequate alone. Upon recheck following hydration, the patient was improved.      ADDITIONAL PROBLEM LIST    DISPOSITION AND DISCUSSIONS  I have discussed management of the patient with the following physicians and FREDIS's: None    Discussion of management with other QHP or appropriate source(s): None    Escalation of care considered, and ultimately not performed:acute inpatient care management, however at this time, the patient is most appropriate for outpatient management    Barriers to care at this time, including but not limited to: Patient does not have established PCP.     Decision tools and prescription drugs considered including, but not limited to: None    FINAL DIAGNOSIS  1. Nausea and vomiting, unspecified vomiting type        2. Generalized abdominal pain                  Electronically signed by: Fer Almanza M.D., 8/23/2023 10:57 AM

## 2023-08-23 NOTE — ED TRIAGE NOTES
Ambulatory to triage from EKG with   Chief Complaint   Patient presents with    Hypertension    Vomiting    Abdominal Pain     Above symptoms upon waking this morning. States BP was 150/90's at home. C/o 3/10 ABD pain that worsens prior to vomiting. 3 episodes of emesis. Denies fever/chills.     /107     Protocol ordered.

## 2023-08-23 NOTE — ED NOTES
Discharge instructions given to pt including follow up with pcp or returning if no improvement of symptoms or to return if worse. Questions answered by RN. Denies any new complaints. Discharged w/stable vitals and able to ambulate to the lobby w/steady gait.

## 2024-08-06 ENCOUNTER — APPOINTMENT (OUTPATIENT)
Dept: RADIOLOGY | Facility: MEDICAL CENTER | Age: 49
End: 2024-08-06
Attending: EMERGENCY MEDICINE

## 2024-08-06 ENCOUNTER — HOSPITAL ENCOUNTER (EMERGENCY)
Facility: MEDICAL CENTER | Age: 49
End: 2024-08-06
Attending: EMERGENCY MEDICINE

## 2024-08-06 VITALS
WEIGHT: 177 LBS | HEIGHT: 60 IN | OXYGEN SATURATION: 95 % | DIASTOLIC BLOOD PRESSURE: 76 MMHG | SYSTOLIC BLOOD PRESSURE: 137 MMHG | BODY MASS INDEX: 34.75 KG/M2 | TEMPERATURE: 98.3 F | RESPIRATION RATE: 16 BRPM | HEART RATE: 81 BPM

## 2024-08-06 DIAGNOSIS — R10.10 UPPER ABDOMINAL PAIN: ICD-10-CM

## 2024-08-06 DIAGNOSIS — R11.2 NAUSEA, VOMITING AND DIARRHEA: ICD-10-CM

## 2024-08-06 DIAGNOSIS — R19.7 NAUSEA, VOMITING AND DIARRHEA: ICD-10-CM

## 2024-08-06 DIAGNOSIS — K82.8 GALLBLADDER SLUDGE: ICD-10-CM

## 2024-08-06 LAB
ALBUMIN SERPL BCP-MCNC: 4.2 G/DL (ref 3.2–4.9)
ALBUMIN/GLOB SERPL: 1.1 G/DL
ALP SERPL-CCNC: 81 U/L (ref 30–99)
ALT SERPL-CCNC: 16 U/L (ref 2–50)
ANION GAP SERPL CALC-SCNC: 18 MMOL/L (ref 7–16)
APPEARANCE UR: ABNORMAL
AST SERPL-CCNC: 17 U/L (ref 12–45)
BACTERIA #/AREA URNS HPF: ABNORMAL /HPF
BASOPHILS # BLD AUTO: 0.1 % (ref 0–1.8)
BASOPHILS # BLD: 0.02 K/UL (ref 0–0.12)
BILIRUB SERPL-MCNC: 1.1 MG/DL (ref 0.1–1.5)
BILIRUB UR QL STRIP.AUTO: NEGATIVE
BUN SERPL-MCNC: 14 MG/DL (ref 8–22)
CALCIUM ALBUM COR SERPL-MCNC: 8.6 MG/DL (ref 8.5–10.5)
CALCIUM SERPL-MCNC: 8.8 MG/DL (ref 8.5–10.5)
CHLORIDE SERPL-SCNC: 105 MMOL/L (ref 96–112)
CO2 SERPL-SCNC: 16 MMOL/L (ref 20–33)
COLOR UR: YELLOW
CREAT SERPL-MCNC: 0.67 MG/DL (ref 0.5–1.4)
EKG IMPRESSION: NORMAL
EOSINOPHIL # BLD AUTO: 0.15 K/UL (ref 0–0.51)
EOSINOPHIL NFR BLD: 1 % (ref 0–6.9)
EPI CELLS #/AREA URNS HPF: ABNORMAL /HPF
ERYTHROCYTE [DISTWIDTH] IN BLOOD BY AUTOMATED COUNT: 41.4 FL (ref 35.9–50)
GFR SERPLBLD CREATININE-BSD FMLA CKD-EPI: 107 ML/MIN/1.73 M 2
GLOBULIN SER CALC-MCNC: 3.8 G/DL (ref 1.9–3.5)
GLUCOSE SERPL-MCNC: 150 MG/DL (ref 65–99)
GLUCOSE UR STRIP.AUTO-MCNC: NEGATIVE MG/DL
HCG SERPL QL: NEGATIVE
HCT VFR BLD AUTO: 40 % (ref 37–47)
HGB BLD-MCNC: 13.5 G/DL (ref 12–16)
HYALINE CASTS #/AREA URNS LPF: ABNORMAL /LPF
IMM GRANULOCYTES # BLD AUTO: 0.05 K/UL (ref 0–0.11)
IMM GRANULOCYTES NFR BLD AUTO: 0.3 % (ref 0–0.9)
KETONES UR STRIP.AUTO-MCNC: 15 MG/DL
LEUKOCYTE ESTERASE UR QL STRIP.AUTO: ABNORMAL
LIPASE SERPL-CCNC: 35 U/L (ref 11–82)
LYMPHOCYTES # BLD AUTO: 0.54 K/UL (ref 1–4.8)
LYMPHOCYTES NFR BLD: 3.5 % (ref 22–41)
MCH RBC QN AUTO: 26.9 PG (ref 27–33)
MCHC RBC AUTO-ENTMCNC: 33.8 G/DL (ref 32.2–35.5)
MCV RBC AUTO: 79.8 FL (ref 81.4–97.8)
MICRO URNS: ABNORMAL
MONOCYTES # BLD AUTO: 0.34 K/UL (ref 0–0.85)
MONOCYTES NFR BLD AUTO: 2.2 % (ref 0–13.4)
NEUTROPHILS # BLD AUTO: 14.37 K/UL (ref 1.82–7.42)
NEUTROPHILS NFR BLD: 92.9 % (ref 44–72)
NITRITE UR QL STRIP.AUTO: NEGATIVE
NRBC # BLD AUTO: 0 K/UL
NRBC BLD-RTO: 0 /100 WBC (ref 0–0.2)
PH UR STRIP.AUTO: 6 [PH] (ref 5–8)
PLATELET # BLD AUTO: 297 K/UL (ref 164–446)
PMV BLD AUTO: 10.8 FL (ref 9–12.9)
POTASSIUM SERPL-SCNC: 3.9 MMOL/L (ref 3.6–5.5)
PROT SERPL-MCNC: 8 G/DL (ref 6–8.2)
PROT UR QL STRIP: 30 MG/DL
RBC # BLD AUTO: 5.01 M/UL (ref 4.2–5.4)
RBC # URNS HPF: ABNORMAL /HPF
RBC UR QL AUTO: NEGATIVE
SODIUM SERPL-SCNC: 139 MMOL/L (ref 135–145)
SP GR UR STRIP.AUTO: 1.03
TROPONIN T SERPL-MCNC: <6 NG/L (ref 6–19)
UROBILINOGEN UR STRIP.AUTO-MCNC: 0.2 MG/DL
WBC # BLD AUTO: 15.5 K/UL (ref 4.8–10.8)
WBC #/AREA URNS HPF: ABNORMAL /HPF

## 2024-08-06 PROCEDURE — 76705 ECHO EXAM OF ABDOMEN: CPT

## 2024-08-06 PROCEDURE — 84703 CHORIONIC GONADOTROPIN ASSAY: CPT

## 2024-08-06 PROCEDURE — 80053 COMPREHEN METABOLIC PANEL: CPT

## 2024-08-06 PROCEDURE — 36415 COLL VENOUS BLD VENIPUNCTURE: CPT

## 2024-08-06 PROCEDURE — 96375 TX/PRO/DX INJ NEW DRUG ADDON: CPT

## 2024-08-06 PROCEDURE — 81001 URINALYSIS AUTO W/SCOPE: CPT

## 2024-08-06 PROCEDURE — 83690 ASSAY OF LIPASE: CPT

## 2024-08-06 PROCEDURE — 84484 ASSAY OF TROPONIN QUANT: CPT

## 2024-08-06 PROCEDURE — 71045 X-RAY EXAM CHEST 1 VIEW: CPT

## 2024-08-06 PROCEDURE — 96374 THER/PROPH/DIAG INJ IV PUSH: CPT

## 2024-08-06 PROCEDURE — 93005 ELECTROCARDIOGRAM TRACING: CPT | Performed by: EMERGENCY MEDICINE

## 2024-08-06 PROCEDURE — 99285 EMERGENCY DEPT VISIT HI MDM: CPT

## 2024-08-06 PROCEDURE — 700111 HCHG RX REV CODE 636 W/ 250 OVERRIDE (IP): Mod: JZ | Performed by: EMERGENCY MEDICINE

## 2024-08-06 PROCEDURE — 85025 COMPLETE CBC W/AUTO DIFF WBC: CPT

## 2024-08-06 RX ORDER — MORPHINE SULFATE 4 MG/ML
4 INJECTION INTRAVENOUS ONCE
Status: COMPLETED | OUTPATIENT
Start: 2024-08-06 | End: 2024-08-06

## 2024-08-06 RX ORDER — ONDANSETRON 2 MG/ML
4 INJECTION INTRAMUSCULAR; INTRAVENOUS ONCE
Status: COMPLETED | OUTPATIENT
Start: 2024-08-06 | End: 2024-08-06

## 2024-08-06 RX ORDER — ONDANSETRON 4 MG/1
4 TABLET, ORALLY DISINTEGRATING ORAL EVERY 6 HOURS PRN
Qty: 5 TABLET | Refills: 0 | Status: SHIPPED | OUTPATIENT
Start: 2024-08-06

## 2024-08-06 RX ADMIN — ONDANSETRON 4 MG: 2 INJECTION INTRAMUSCULAR; INTRAVENOUS at 05:38

## 2024-08-06 RX ADMIN — MORPHINE SULFATE 4 MG: 4 INJECTION, SOLUTION INTRAMUSCULAR; INTRAVENOUS at 05:38

## 2024-08-06 ASSESSMENT — FIBROSIS 4 INDEX: FIB4 SCORE: 0.88

## 2024-08-06 NOTE — ED TRIAGE NOTES
Chief Complaint   Patient presents with    Abdominal Pain     Intermittent Epigastric abdominal pain, (+) N/V/D x6 hours. Denies any blood in vomit or diarrhea.     Hypertension       Pt via wheelchair to triage. Pt A&Ox4, room air.     Pt to phlebotomy . Pt educated on alerting staff in changes to condition. Pt verbalized understanding. Protocol ordered.     BP (!) 166/106 Comment: left ARM  Pulse (!) 115   Temp 36.7 °C (98 °F) (Temporal)   Resp 20   Ht 1.524 m (5')   Wt 80.3 kg (177 lb)   SpO2 97%   BMI 34.57 kg/m²

## 2024-08-06 NOTE — ED NOTES
Patient discharged from [facility] ED to home with spouse. Discharge teaching completed at bedside and patient signature obtained. Discharge medications reviewed and verbalized by patient and/or family. All questions and concerns addressed. Follow up explained with return instructions. PIV removed. All patient belongings with pt at time of discharge. Patient ambulatory with steady gait at time of discharge to [facility]. Mode of transportation: vehicle

## 2024-08-06 NOTE — ED NOTES
Patient wheeled in wheelchair from lobby with ER RN. Agree with triage note. Connected to vitals monitoring. Care assumed, chart up for ERP.

## 2024-08-06 NOTE — ED PROVIDER NOTES
ED Provider Note    CHIEF COMPLAINT  Chief Complaint   Patient presents with    Abdominal Pain     Intermittent Epigastric abdominal pain, (+) N/V/D x6 hours. Denies any blood in vomit or diarrhea.     Hypertension        HPI    Primary care provider: Pcp Pt States None   History obtained from: Patient,  and video   History limited by: None     Kirsten Sainz is a 48 y.o. female who presents to the ED with  and son complaining of intermittent epigastric abdominal pain with approximately 10 episodes of nausea and vomiting along with diarrhea over the past several hours without gross blood noted.  No fever.  No dysuria.  Patient denies possibility of pregnancy with history of tubal ligation.  No ill contacts, suspicious oral intake or recent travels.    REVIEW OF SYSTEMS  Please see HPI for pertinent positives/negatives.  All other systems reviewed and are negative.     PAST MEDICAL HISTORY  No past medical history on file.     SURGICAL HISTORY  Past Surgical History:   Procedure Laterality Date    REPEAT C SECTION W TUBAL LIGATION  2/6/2011    Performed by KEIKO RESENDEZ at [facility]    BREAST BIOPSY      PRIMARY C SECTION  x 3         SOCIAL HISTORY  Social History     Tobacco Use    Smoking status: Never    Smokeless tobacco: Never   Vaping Use    Vaping status: Never Used   Substance and Sexual Activity    Alcohol use: No    Drug use: No    Sexual activity: Yes     Partners: Male        FAMILY HISTORY  Family History   Problem Relation Age of Onset    Hypertension Maternal Grandmother         CURRENT MEDICATIONS  Home Medications       Reviewed by Denisa Edmond R.N. (Registered Nurse) on 08/06/24 at 0259  Med List Status: Partial     Medication Last Dose Status   docusate sodium (COLACE) 100 MG CAPS  Active   hydrOXYzine HCl (ATARAX) 25 MG Tab  Active   ibuprofen (MOTRIN) 800 MG TABS  Active   ketotifen  (ZADITOR) 0.025 % ophthalmic solution  Active   methylPREDNISolone (MEDROL DOSEPAK) 4 MG Tablet Therapy Pack  Active   montelukast (SINGULAIR) 10 MG Tab  Active   triamcinolone acetonide (KENALOG) 0.025 % Cream  Active   triamcinolone acetonide (KENALOG) 0.1 % Cream  Active   triamcinolone acetonide (KENALOG) 0.1 % Ointment  Active                     ALLERGIES  Allergies   Allergen Reactions    Nkda [No Known Drug Allergy]         PHYSICAL EXAM  VITAL SIGNS: /76   Pulse 81   Temp 36.8 °C (98.3 °F) (Temporal)   Resp 16   Ht 1.524 m (5')   Wt 80.3 kg (177 lb)   SpO2 95%   BMI 34.57 kg/m²  @FANTA[[id number]::@     Pulse ox interpretation: 95% I interpret this pulse ox as normal     Cardiac monitor interpretation: Sinus rhythm with heart rate in the 90s as interpreted by me.  The patient presented with upper abdominal pain and cardiac monitor was ordered to monitor for dysrhythmia.    Constitutional: Well developed, well nourished, alert in no apparent distress, nontoxic appearance    HENT: No external signs of trauma, normocephalic  Eyes: PERRL, conjunctiva without erythema, no discharge, no icterus    Neck: Soft and supple, trachea midline, no stridor, no tenderness, no LAD, good ROM    Cardiovascular: Regular rate and rhythm, no murmurs/rubs/gallops, strong distal pulses and good perfusion    Thorax & Lungs: No respiratory distress, CTAB    Abdomen: Soft, mild epigastric tenderness to palpation, nondistended, no guarding, no rebound, normal BS    Back: No CVAT     Extremities: No cyanosis, no edema, no gross deformity, intact distal pulses with brisk cap refill    Skin: Warm, dry, no pallor/cyanosis, no rash noted      Neuro: A/O times 3, no focal deficits noted    Psychiatric: Cooperative, slightly anxious      DIAGNOSTIC STUDIES / PROCEDURES    EKG  12 Lead EKG obtained at 0436 and interpreted by me:   Rate: 99   Rhythm: Sinus rhythm   Ectopy: None  Intervals: Normal   Axis: Normal   QRS: Normal   ST  segments: Normal  T Waves: Normal    Clinical Impression: Sinus rhythm without acute ischemic changes or dysrhythmia  Compared to August 23, 2023 without significant change      LABS  All labs reviewed by me.     Results for orders placed or performed during the hospital encounter of 08/06/24   Troponin   Result Value Ref Range    Troponin T <6 6 - 19 ng/L   CBC WITH DIFFERENTIAL   Result Value Ref Range    WBC 15.5 (H) 4.8 - 10.8 K/uL    RBC 5.01 4.20 - 5.40 M/uL    Hemoglobin 13.5 12.0 - 16.0 g/dL    Hematocrit 40.0 37.0 - 47.0 %    MCV 79.8 (L) 81.4 - 97.8 fL    MCH 26.9 (L) 27.0 - 33.0 pg    MCHC 33.8 32.2 - 35.5 g/dL    RDW 41.4 35.9 - 50.0 fL    Platelet Count 297 164 - 446 K/uL    MPV 10.8 9.0 - 12.9 fL    Neutrophils-Polys 92.90 (H) 44.00 - 72.00 %    Lymphocytes 3.50 (L) 22.00 - 41.00 %    Monocytes 2.20 0.00 - 13.40 %    Eosinophils 1.00 0.00 - 6.90 %    Basophils 0.10 0.00 - 1.80 %    Immature Granulocytes 0.30 0.00 - 0.90 %    Nucleated RBC 0.00 0.00 - 0.20 /100 WBC    Neutrophils (Absolute) 14.37 (H) 1.82 - 7.42 K/uL    Lymphs (Absolute) 0.54 (L) 1.00 - 4.80 K/uL    Monos (Absolute) 0.34 0.00 - 0.85 K/uL    Eos (Absolute) 0.15 0.00 - 0.51 K/uL    Baso (Absolute) 0.02 0.00 - 0.12 K/uL    Immature Granulocytes (abs) 0.05 0.00 - 0.11 K/uL    NRBC (Absolute) 0.00 K/uL   COMP METABOLIC PANEL   Result Value Ref Range    Sodium 139 135 - 145 mmol/L    Potassium 3.9 3.6 - 5.5 mmol/L    Chloride 105 96 - 112 mmol/L    Co2 16 (L) 20 - 33 mmol/L    Anion Gap 18.0 (H) 7.0 - 16.0    Glucose 150 (H) 65 - 99 mg/dL    Bun 14 8 - 22 mg/dL    Creatinine 0.67 0.50 - 1.40 mg/dL    Calcium 8.8 8.5 - 10.5 mg/dL    Correct Calcium 8.6 8.5 - 10.5 mg/dL    AST(SGOT) 17 12 - 45 U/L    ALT(SGPT) 16 2 - 50 U/L    Alkaline Phosphatase 81 30 - 99 U/L    Total Bilirubin 1.1 0.1 - 1.5 mg/dL    Albumin 4.2 3.2 - 4.9 g/dL    Total Protein 8.0 6.0 - 8.2 g/dL    Globulin 3.8 (H) 1.9 - 3.5 g/dL    A-G Ratio 1.1 g/dL   LIPASE   Result Value  Ref Range    Lipase 35 11 - 82 U/L   HCG QUAL SERUM   Result Value Ref Range    Beta-Hcg Qualitative Serum Negative Negative   URINALYSIS,CULTURE IF INDICATED    Specimen: Urine   Result Value Ref Range    Color Yellow     Character Cloudy (A)     Specific Gravity 1.028 <1.035    Ph 6.0 5.0 - 8.0    Glucose Negative Negative mg/dL    Ketones 15 (A) Negative mg/dL    Protein 30 (A) Negative mg/dL    Bilirubin Negative Negative    Urobilinogen, Urine 0.2 Negative    Nitrite Negative Negative    Leukocyte Esterase Trace (A) Negative    Occult Blood Negative Negative    Micro Urine Req Microscopic    ESTIMATED GFR   Result Value Ref Range    GFR (CKD-EPI) 107 >60 mL/min/1.73 m 2   URINE MICROSCOPIC (W/UA)   Result Value Ref Range    WBC 0-2 /hpf    RBC 0-2 /hpf    Bacteria Many (A) None /hpf    Epithelial Cells Many (A) /hpf    Hyaline Cast 0-2 /lpf   EKG   Result Value Ref Range    Report       [facility]    Test Date:  2024  Pt Name:    ESTUARDO GARG           Department: ER  MRN:        [id number]                      Room:  Gender:     Female                       Technician: [id number]  :        1975                   Requested By:ER TRIAGE PROTOCOL  Order #:    [id number]                    Reading MD:    Measurements  Intervals                                Axis  Rate:       99                           P:          48  UT:         139                          QRS:        39  QRSD:       80                           T:          -7  QT:         346  QTc:        444    Interpretive Statements  Sinus rhythm  Borderline T abnormalities, diffuse leads  Compared to ECG 2023 09:43:27  T-wave abnormality now present          RADIOLOGY  I have independently interpreted the diagnostic imaging associated with this visit and am waiting the final reading from the radiologist.   My preliminary interpretation is as follows: No acute findings on portable chest x-ray.    US-RUQ    Final Result      1.  Gallbladder sludge without stones or biliary dilation      2.  Hepatic steatosis      DX-CHEST-PORTABLE (1 VIEW)   Final Result      Mild cardiomegaly.             COURSE & MEDICAL DECISION MAKING  Nursing notes, VS, PMSFHx reviewed in chart.     Review of past medical records shows the patient was last seen in this ED August 23, 2023 for hypertension, vomiting and abdominal pain.  She was seen in urgent care on September 7, 2020 and diagnosed with dermatitis.  CT abdomen/pelvis on August 23, 2023 had the following findings:    1.  No evidence of acute inflammatory process in the abdomen or pelvis  2.  7 mm LEFT adrenal nodule which is nonspecific on this exam. The strong majority of incidentally detected adrenal nodules are benign.      Differential diagnoses considered include but are not limited to: AMI, AAA, bowel perforation/obstruction, ileus, cholecystitis/ascending cholangitis, gallstone/biliary colic, pancreatitis, PUD, gastritis, GERD, gastroenteritis, colitis, muscle strain, hernia, pregnancy/ectopic      ED Observation Status? No; Patient does not meet criteria for ED Observation.       Discussion of management with other QHP or appropriate source(s): None     Escalation of care considered, and ultimately not performed: acute inpatient care management, however at this time, the patient is most appropriate for outpatient management.     Barriers to care at this time, including but not limited to: Patient does not have established PCP.     Decision tools and prescription drugs considered including, but not limited to: Pain Medications   .        History and physical exam as above.  This is a pleasant generally healthy 48-year-old female patient who presents with  and son to the ED with complaint of epigastric abdominal pain with nausea, vomiting and diarrhea.  Given location of her pain, EKG was obtained and without acute findings or significant change compared to prior and  troponin without elevation to suggest ACS.  I also have low clinical suspicion at this time for other emergent pathology such as dissection, tamponade, myocarditis.  Right upper quadrant ultrasound with findings as above.  Patient without evidence for acute hepatic or biliary normality.  She has mild anion gap acidosis which may be due to her vomiting without evidence for significant electrolyte derangement.  No renal dysfunction.  Mild hyperglycemia is likely stress response along with her leukocytosis.  UA with many epithelial cells limiting its utility although she denies dysuria to suggest acute UTI.  She was treated with Zofran and morphine with improvement of her symptoms and tolerated oral intake in the ED without further vomiting.  I discussed the findings with patient and .  On recheck, patient is noted to be in no acute distress and nontoxic in appearance and without evidence for acute abdomen.  At this time, no convincing evidence for emergent pathology or indications for admission.  I discussed with them outpatient follow-up and return to ED precautions.  She will be given outpatient referral to surgery regarding her gallbladder sludge.  Patient will also be prescribed Zofran to use as needed.  Patient also noted with incidental elevated blood pressure in the ED without evidence for hypertensive emergency and can follow-up on outpatient basis for further management.  Patient verbalized understanding and agreed with plan of care with no further questions or concerns.      The patient is referred to a primary physician for blood pressure management, diabetic screening, and for all other preventative health concerns.       FINAL IMPRESSION  1. Upper abdominal pain Acute   2. Nausea, vomiting and diarrhea Acute   3. Gallbladder sludge Active          DISPOSITION  Patient will be discharged home in stable condition.       FOLLOW UP  [facility] ([facility]) - Primary Care and Family  Medicine  [address] [facility] [facility] [address]  [phone]  Call today      [facility] - Primary Care  [address]  [facility] [address]  [phone]  Call today      [facility], Emergency Dept  [address] [facility] [id number] [phone]    If symptoms worsen    Ti Andujar M.D.  [address]  [facility] [id number] [phone]    Call today           OUTPATIENT MEDICATIONS  Discharge Medication List as of 8/6/2024  7:02 AM        START taking these medications    Details   ondansetron (ZOFRAN ODT) 4 MG TABLET DISPERSIBLE Take 1 Tablet by mouth every 6 hours as needed for Nausea/Vomiting., Disp-5 Tablet, R-0, Normal                Electronically signed by: Doyle Saucedo D.O., 8/6/2024 4:23 AM      Portions of this record were made with voice recognition software.  Despite my review, errors may remain.  Please interpret this chart in the appropriate context.

## 2024-08-06 NOTE — ED NOTES
Pt completed PO challenge, does not report pain or nausea at this time. Requesting extra blanket, provided.

## 2025-02-04 ENCOUNTER — TELEPHONE (OUTPATIENT)
Dept: HEALTH INFORMATION MANAGEMENT | Facility: OTHER | Age: 50
End: 2025-02-04